# Patient Record
Sex: FEMALE | NOT HISPANIC OR LATINO
[De-identification: names, ages, dates, MRNs, and addresses within clinical notes are randomized per-mention and may not be internally consistent; named-entity substitution may affect disease eponyms.]

---

## 2017-10-09 ENCOUNTER — APPOINTMENT (OUTPATIENT)
Dept: SURGERY | Facility: CLINIC | Age: 32
End: 2017-10-09

## 2017-10-09 ENCOUNTER — OUTPATIENT (OUTPATIENT)
Dept: OUTPATIENT SERVICES | Facility: HOSPITAL | Age: 32
LOS: 1 days | End: 2017-10-09
Payer: COMMERCIAL

## 2017-10-09 VITALS
HEART RATE: 79 BPM | SYSTOLIC BLOOD PRESSURE: 135 MMHG | TEMPERATURE: 99 F | WEIGHT: 191.8 LBS | HEIGHT: 60 IN | DIASTOLIC BLOOD PRESSURE: 82 MMHG | RESPIRATION RATE: 16 BRPM

## 2017-10-09 DIAGNOSIS — Z01.818 ENCOUNTER FOR OTHER PREPROCEDURAL EXAMINATION: ICD-10-CM

## 2017-10-09 DIAGNOSIS — J30.89 OTHER ALLERGIC RHINITIS: ICD-10-CM

## 2017-10-09 DIAGNOSIS — M51.26 OTHER INTERVERTEBRAL DISC DISPLACEMENT, LUMBAR REGION: ICD-10-CM

## 2017-10-09 DIAGNOSIS — F41.1 GENERALIZED ANXIETY DISORDER: ICD-10-CM

## 2017-10-09 DIAGNOSIS — Z90.89 ACQUIRED ABSENCE OF OTHER ORGANS: Chronic | ICD-10-CM

## 2017-10-09 DIAGNOSIS — K08.499 PARTIAL LOSS OF TEETH DUE TO OTHER SPECIFIED CAUSE, UNSPECIFIED CLASS: Chronic | ICD-10-CM

## 2017-10-09 PROBLEM — Z00.00 ENCOUNTER FOR PREVENTIVE HEALTH EXAMINATION: Status: ACTIVE | Noted: 2017-10-09

## 2017-10-09 LAB
ALBUMIN SERPL ELPH-MCNC: 4.7 G/DL — SIGNIFICANT CHANGE UP (ref 3.3–5)
ALP SERPL-CCNC: 81 U/L — SIGNIFICANT CHANGE UP (ref 40–120)
ALT FLD-CCNC: 14 U/L — SIGNIFICANT CHANGE UP (ref 10–45)
ANION GAP SERPL CALC-SCNC: 16 MMOL/L — SIGNIFICANT CHANGE UP (ref 5–17)
APPEARANCE UR: SIGNIFICANT CHANGE UP
APTT BLD: 31.3 SEC — SIGNIFICANT CHANGE UP (ref 27.5–37.4)
AST SERPL-CCNC: 21 U/L — SIGNIFICANT CHANGE UP (ref 10–40)
BACTERIA # UR AUTO: PRESENT /HPF
BILIRUB SERPL-MCNC: 0.4 MG/DL — SIGNIFICANT CHANGE UP (ref 0.2–1.2)
BILIRUB UR-MCNC: NEGATIVE — SIGNIFICANT CHANGE UP
BUN SERPL-MCNC: 10 MG/DL — SIGNIFICANT CHANGE UP (ref 7–23)
CALCIUM SERPL-MCNC: 9.9 MG/DL — SIGNIFICANT CHANGE UP (ref 8.4–10.5)
CHLORIDE SERPL-SCNC: 99 MMOL/L — SIGNIFICANT CHANGE UP (ref 96–108)
CO2 SERPL-SCNC: 24 MMOL/L — SIGNIFICANT CHANGE UP (ref 22–31)
COLOR SPEC: YELLOW — SIGNIFICANT CHANGE UP
CREAT SERPL-MCNC: 0.82 MG/DL — SIGNIFICANT CHANGE UP (ref 0.5–1.3)
DIFF PNL FLD: NEGATIVE — SIGNIFICANT CHANGE UP
EPI CELLS # UR: (no result) /HPF
GLUCOSE SERPL-MCNC: 73 MG/DL — SIGNIFICANT CHANGE UP (ref 70–99)
GLUCOSE UR QL: NEGATIVE — SIGNIFICANT CHANGE UP
HCT VFR BLD CALC: 40 % — SIGNIFICANT CHANGE UP (ref 34.5–45)
HGB BLD-MCNC: 13.1 G/DL — SIGNIFICANT CHANGE UP (ref 11.5–15.5)
INR BLD: 1.07 — SIGNIFICANT CHANGE UP (ref 0.88–1.16)
KETONES UR-MCNC: NEGATIVE — SIGNIFICANT CHANGE UP
LEUKOCYTE ESTERASE UR-ACNC: (no result)
MCHC RBC-ENTMCNC: 28.5 PG — SIGNIFICANT CHANGE UP (ref 27–34)
MCHC RBC-ENTMCNC: 32.8 G/DL — SIGNIFICANT CHANGE UP (ref 32–36)
MCV RBC AUTO: 87 FL — SIGNIFICANT CHANGE UP (ref 80–100)
NITRITE UR-MCNC: NEGATIVE — SIGNIFICANT CHANGE UP
PH UR: 6 — SIGNIFICANT CHANGE UP (ref 5–8)
PLATELET # BLD AUTO: 229 K/UL — SIGNIFICANT CHANGE UP (ref 150–400)
POTASSIUM SERPL-MCNC: 3.7 MMOL/L — SIGNIFICANT CHANGE UP (ref 3.5–5.3)
POTASSIUM SERPL-SCNC: 3.7 MMOL/L — SIGNIFICANT CHANGE UP (ref 3.5–5.3)
PROT SERPL-MCNC: 8.6 G/DL — HIGH (ref 6–8.3)
PROT UR-MCNC: NEGATIVE MG/DL — SIGNIFICANT CHANGE UP
PROTHROM AB SERPL-ACNC: 11.9 SEC — SIGNIFICANT CHANGE UP (ref 9.8–12.7)
RBC # BLD: 4.6 M/UL — SIGNIFICANT CHANGE UP (ref 3.8–5.2)
RBC # FLD: 12.4 % — SIGNIFICANT CHANGE UP (ref 10.3–16.9)
RBC CASTS # UR COMP ASSIST: < 5 /HPF — SIGNIFICANT CHANGE UP
SODIUM SERPL-SCNC: 139 MMOL/L — SIGNIFICANT CHANGE UP (ref 135–145)
SP GR SPEC: >=1.03 — SIGNIFICANT CHANGE UP (ref 1–1.03)
UROBILINOGEN FLD QL: 0.2 E.U./DL — SIGNIFICANT CHANGE UP
WBC # BLD: 4.9 K/UL — SIGNIFICANT CHANGE UP (ref 3.8–10.5)
WBC # FLD AUTO: 4.9 K/UL — SIGNIFICANT CHANGE UP (ref 3.8–10.5)
WBC UR QL: (no result) /HPF

## 2017-10-09 PROCEDURE — 93010 ELECTROCARDIOGRAM REPORT: CPT

## 2017-10-09 NOTE — H&P PST ADULT - FAMILY HISTORY
Mother  Still living? Yes, Estimated age: Age Unknown  Family history of diabetes mellitus, Age at diagnosis: Age Unknown  Breast cancer, Age at diagnosis: Age Unknown
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
## 2017-10-09 NOTE — H&P PST ADULT - NSANTHOSAYNRD_GEN_A_CORE
No. KELSIE screening performed.  STOP BANG Legend: 0-2 = LOW Risk; 3-4 = INTERMEDIATE Risk; 5-8 = HIGH Risk

## 2017-10-10 LAB
CULTURE RESULTS: SIGNIFICANT CHANGE UP
SPECIMEN SOURCE: SIGNIFICANT CHANGE UP

## 2017-10-17 VITALS
TEMPERATURE: 98 F | HEART RATE: 90 BPM | SYSTOLIC BLOOD PRESSURE: 118 MMHG | RESPIRATION RATE: 18 BRPM | HEIGHT: 60 IN | WEIGHT: 190.04 LBS | OXYGEN SATURATION: 99 % | DIASTOLIC BLOOD PRESSURE: 56 MMHG

## 2017-10-17 NOTE — H&P ADULT - NSHPPHYSICALEXAM_GEN_ALL_CORE
Musculoskeletal: Decreased ROM secondary to pain, lumbar spine.    Remainder of physical exam as per medical clearance note. Musculoskeletal: Decreased ROM secondary to pain, lumbar spine. SLT INTACT, DP/PT 2+, EHL/TA/GS 5/5 b/l les    Remainder of physical exam as per medical clearance note.

## 2017-10-17 NOTE — H&P ADULT - PROBLEM SELECTOR PLAN 1
Admit to Orthopaedic Service.  Presents today for elective Anterior Spinal Fusion L5-S1, Anterior total disc replacement L4-L5, discectomy and fusion.  Pt medically stable and cleared for procedure today by Dr. Panda Glover MD.

## 2017-10-17 NOTE — H&P ADULT - NSHPLABSRESULTS_GEN_ALL_CORE
Preop CBC, BMP, PT/PTT/INR - WNL per medical clearance. Preop UA +WBCs 5-10/hpf, Bacteria present, moderate epithelial cells, culture insignificant amount of growth. Repeat UA on DOS.  Preop EKG sinus bradycardia - WNL per medical clearance
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
## 2017-10-18 ENCOUNTER — INPATIENT (INPATIENT)
Facility: HOSPITAL | Age: 32
LOS: 0 days | Discharge: HOME | DRG: 552 | End: 2017-10-18
Attending: ORTHOPAEDIC SURGERY | Admitting: ORTHOPAEDIC SURGERY
Payer: COMMERCIAL

## 2017-10-18 VITALS — SYSTOLIC BLOOD PRESSURE: 111 MMHG | DIASTOLIC BLOOD PRESSURE: 65 MMHG | HEART RATE: 65 BPM | RESPIRATION RATE: 18 BRPM

## 2017-10-18 DIAGNOSIS — K08.499 PARTIAL LOSS OF TEETH DUE TO OTHER SPECIFIED CAUSE, UNSPECIFIED CLASS: Chronic | ICD-10-CM

## 2017-10-18 DIAGNOSIS — Z90.89 ACQUIRED ABSENCE OF OTHER ORGANS: Chronic | ICD-10-CM

## 2017-10-18 DIAGNOSIS — M51.26 OTHER INTERVERTEBRAL DISC DISPLACEMENT, LUMBAR REGION: ICD-10-CM

## 2017-10-18 LAB
APPEARANCE UR: (no result)
BILIRUB UR-MCNC: (no result)
COLOR SPEC: YELLOW — SIGNIFICANT CHANGE UP
DIFF PNL FLD: (no result)
GLUCOSE UR QL: NEGATIVE — SIGNIFICANT CHANGE UP
KETONES UR-MCNC: 15 MG/DL
LEUKOCYTE ESTERASE UR-ACNC: (no result)
MRSA PCR RESULT.: NEGATIVE — SIGNIFICANT CHANGE UP
NITRITE UR-MCNC: NEGATIVE — SIGNIFICANT CHANGE UP
PH UR: 7.5 — SIGNIFICANT CHANGE UP (ref 5–8)
PROT UR-MCNC: NEGATIVE MG/DL — SIGNIFICANT CHANGE UP
S AUREUS DNA NOSE QL NAA+PROBE: POSITIVE
SP GR SPEC: 1.01 — SIGNIFICANT CHANGE UP (ref 1–1.03)
UROBILINOGEN FLD QL: 1 E.U./DL — SIGNIFICANT CHANGE UP

## 2017-10-18 RX ORDER — DICLOFENAC SODIUM 75 MG/1
0 TABLET, DELAYED RELEASE ORAL
Qty: 0 | Refills: 0 | COMMUNITY

## 2017-10-18 RX ORDER — INFLUENZA VIRUS VACCINE 15; 15; 15; 15 UG/.5ML; UG/.5ML; UG/.5ML; UG/.5ML
0.5 SUSPENSION INTRAMUSCULAR ONCE
Qty: 0 | Refills: 0 | Status: DISCONTINUED | OUTPATIENT
Start: 2017-10-18 | End: 2017-10-18

## 2017-10-19 LAB
CULTURE RESULTS: SIGNIFICANT CHANGE UP
SPECIMEN SOURCE: SIGNIFICANT CHANGE UP

## 2017-10-19 PROCEDURE — 87086 URINE CULTURE/COLONY COUNT: CPT

## 2017-10-19 PROCEDURE — 86901 BLOOD TYPING SEROLOGIC RH(D): CPT

## 2017-10-19 PROCEDURE — 86850 RBC ANTIBODY SCREEN: CPT

## 2017-10-19 PROCEDURE — 86900 BLOOD TYPING SEROLOGIC ABO: CPT

## 2017-10-19 PROCEDURE — 81001 URINALYSIS AUTO W/SCOPE: CPT

## 2017-10-19 PROCEDURE — 87641 MR-STAPH DNA AMP PROBE: CPT

## 2017-10-20 DIAGNOSIS — M48.07 SPINAL STENOSIS, LUMBOSACRAL REGION: ICD-10-CM

## 2017-10-20 DIAGNOSIS — M48.061 SPINAL STENOSIS, LUMBAR REGION WITHOUT NEUROGENIC CLAUDICATION: ICD-10-CM

## 2017-10-20 DIAGNOSIS — M54.32 SCIATICA, LEFT SIDE: ICD-10-CM

## 2017-10-20 DIAGNOSIS — E66.9 OBESITY, UNSPECIFIED: ICD-10-CM

## 2017-10-20 DIAGNOSIS — Z53.09 PROCEDURE AND TREATMENT NOT CARRIED OUT BECAUSE OF OTHER CONTRAINDICATION: ICD-10-CM

## 2017-10-24 VITALS
DIASTOLIC BLOOD PRESSURE: 53 MMHG | WEIGHT: 190.04 LBS | RESPIRATION RATE: 18 BRPM | HEIGHT: 60 IN | OXYGEN SATURATION: 98 % | TEMPERATURE: 98 F | SYSTOLIC BLOOD PRESSURE: 104 MMHG | HEART RATE: 91 BPM

## 2017-10-24 NOTE — H&P ADULT - NSHPPHYSICALEXAM_GEN_ALL_CORE
Back decreased ROM secondary to pain  Rest of PE per MD clearance PE: A+O x 3  Normal head, atraumatic. Normal skin, no rashes/lesions/erythema.    MSK: Back decreased ROM secondary to pain. No deformity or open wounds. No rashes or lesions. Left GS: 4/5, right GS: 5/5. EHL/TA/FHL 5/5 BLE. Sensation intact and equal BLE. Skin warm and well perfused. DP palpable BLE. Cap refill brisk.     Rest of PE per MD clearance PE: A+O x 3  Normal head, atraumatic. Normal skin, no rashes/lesions/erythema.    MSK: Back decreased ROM secondary to pain. Left GS: 4/5. Decreased sensation to left foot diffusely. No deformity or open wounds. No rashes or lesions. Right GS: 5/5. EHL/TA/FHL 5/5 BLE. Sensation intact to right LE. Skin warm and well perfused. DP palpable BLE. Cap refill brisk.     Rest of PE per MD clearance

## 2017-10-24 NOTE — PATIENT PROFILE ADULT. - VISION (WITH CORRECTIVE LENSES IF THE PATIENT USUALLY WEARS THEM):
Normal vision: sees adequately in most situations; can see medication labels, newsprint/glasses for reading and distance

## 2017-10-24 NOTE — H&P ADULT - NSHPLABSRESULTS_GEN_ALL_CORE
Preop cbc, bmp, pt/inr, ptt wnl; nasal swab +MSSA  +UA but urine culture neg  preop ekg sinus lorena per clearance
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
## 2017-10-25 ENCOUNTER — RESULT REVIEW (OUTPATIENT)
Age: 32
End: 2017-10-25

## 2017-10-25 ENCOUNTER — INPATIENT (INPATIENT)
Facility: HOSPITAL | Age: 32
LOS: 4 days | Discharge: HOME | DRG: 460 | End: 2017-10-30
Attending: ORTHOPAEDIC SURGERY | Admitting: ORTHOPAEDIC SURGERY
Payer: COMMERCIAL

## 2017-10-25 DIAGNOSIS — Z90.89 ACQUIRED ABSENCE OF OTHER ORGANS: Chronic | ICD-10-CM

## 2017-10-25 DIAGNOSIS — K08.499 PARTIAL LOSS OF TEETH DUE TO OTHER SPECIFIED CAUSE, UNSPECIFIED CLASS: Chronic | ICD-10-CM

## 2017-10-25 DIAGNOSIS — M51.26 OTHER INTERVERTEBRAL DISC DISPLACEMENT, LUMBAR REGION: ICD-10-CM

## 2017-10-25 LAB
ANION GAP SERPL CALC-SCNC: 15 MMOL/L — SIGNIFICANT CHANGE UP (ref 5–17)
BASOPHILS NFR BLD AUTO: 0.1 % — SIGNIFICANT CHANGE UP (ref 0–2)
BUN SERPL-MCNC: 12 MG/DL — SIGNIFICANT CHANGE UP (ref 7–23)
CALCIUM SERPL-MCNC: 8.8 MG/DL — SIGNIFICANT CHANGE UP (ref 8.4–10.5)
CHLORIDE SERPL-SCNC: 101 MMOL/L — SIGNIFICANT CHANGE UP (ref 96–108)
CO2 SERPL-SCNC: 22 MMOL/L — SIGNIFICANT CHANGE UP (ref 22–31)
CREAT SERPL-MCNC: 0.74 MG/DL — SIGNIFICANT CHANGE UP (ref 0.5–1.3)
EOSINOPHIL NFR BLD AUTO: 0.1 % — SIGNIFICANT CHANGE UP (ref 0–6)
GLUCOSE SERPL-MCNC: 195 MG/DL — HIGH (ref 70–99)
HCT VFR BLD CALC: 33.3 % — LOW (ref 34.5–45)
HGB BLD-MCNC: 11.2 G/DL — LOW (ref 11.5–15.5)
LYMPHOCYTES # BLD AUTO: 10.2 % — LOW (ref 13–44)
MCHC RBC-ENTMCNC: 29.2 PG — SIGNIFICANT CHANGE UP (ref 27–34)
MCHC RBC-ENTMCNC: 33.6 G/DL — SIGNIFICANT CHANGE UP (ref 32–36)
MCV RBC AUTO: 86.7 FL — SIGNIFICANT CHANGE UP (ref 80–100)
MONOCYTES NFR BLD AUTO: 2.7 % — SIGNIFICANT CHANGE UP (ref 2–14)
NEUTROPHILS NFR BLD AUTO: 86.9 % — HIGH (ref 43–77)
PLATELET # BLD AUTO: 228 K/UL — SIGNIFICANT CHANGE UP (ref 150–400)
POTASSIUM SERPL-MCNC: 3.8 MMOL/L — SIGNIFICANT CHANGE UP (ref 3.5–5.3)
POTASSIUM SERPL-SCNC: 3.8 MMOL/L — SIGNIFICANT CHANGE UP (ref 3.5–5.3)
RBC # BLD: 3.84 M/UL — SIGNIFICANT CHANGE UP (ref 3.8–5.2)
RBC # FLD: 12.9 % — SIGNIFICANT CHANGE UP (ref 10.3–16.9)
SODIUM SERPL-SCNC: 138 MMOL/L — SIGNIFICANT CHANGE UP (ref 135–145)
WBC # BLD: 16.2 K/UL — HIGH (ref 3.8–10.5)
WBC # FLD AUTO: 16.2 K/UL — HIGH (ref 3.8–10.5)

## 2017-10-25 PROCEDURE — 22585 ARTHRD ANT NTRBD MIN DSC EA: CPT | Mod: 62,GC

## 2017-10-25 PROCEDURE — 22556 ARTHRD ANT NTRBD MIN DSC THC: CPT | Mod: 62,GC

## 2017-10-25 RX ORDER — FAMOTIDINE 10 MG/ML
20 INJECTION INTRAVENOUS EVERY 12 HOURS
Qty: 0 | Refills: 0 | Status: DISCONTINUED | OUTPATIENT
Start: 2017-10-25 | End: 2017-10-30

## 2017-10-25 RX ORDER — HYDROMORPHONE HYDROCHLORIDE 2 MG/ML
0.5 INJECTION INTRAMUSCULAR; INTRAVENOUS; SUBCUTANEOUS
Qty: 0 | Refills: 0 | Status: DISCONTINUED | OUTPATIENT
Start: 2017-10-25 | End: 2017-10-25

## 2017-10-25 RX ORDER — OXYCODONE AND ACETAMINOPHEN 5; 325 MG/1; MG/1
2 TABLET ORAL EVERY 4 HOURS
Qty: 0 | Refills: 0 | Status: DISCONTINUED | OUTPATIENT
Start: 2017-10-25 | End: 2017-10-26

## 2017-10-25 RX ORDER — DOCUSATE SODIUM 100 MG
100 CAPSULE ORAL
Qty: 0 | Refills: 0 | Status: DISCONTINUED | OUTPATIENT
Start: 2017-10-25 | End: 2017-10-30

## 2017-10-25 RX ORDER — SODIUM CHLORIDE 9 MG/ML
1000 INJECTION, SOLUTION INTRAVENOUS
Qty: 0 | Refills: 0 | Status: DISCONTINUED | OUTPATIENT
Start: 2017-10-25 | End: 2017-10-30

## 2017-10-25 RX ORDER — SENNA PLUS 8.6 MG/1
2 TABLET ORAL AT BEDTIME
Qty: 0 | Refills: 0 | Status: DISCONTINUED | OUTPATIENT
Start: 2017-10-25 | End: 2017-10-30

## 2017-10-25 RX ORDER — ACETAMINOPHEN 500 MG
650 TABLET ORAL EVERY 6 HOURS
Qty: 0 | Refills: 0 | Status: DISCONTINUED | OUTPATIENT
Start: 2017-10-25 | End: 2017-10-26

## 2017-10-25 RX ORDER — ONDANSETRON 8 MG/1
4 TABLET, FILM COATED ORAL EVERY 6 HOURS
Qty: 0 | Refills: 0 | Status: DISCONTINUED | OUTPATIENT
Start: 2017-10-25 | End: 2017-10-30

## 2017-10-25 RX ORDER — FAMOTIDINE 10 MG/ML
20 INJECTION INTRAVENOUS ONCE
Qty: 0 | Refills: 0 | Status: COMPLETED | OUTPATIENT
Start: 2017-10-25 | End: 2017-10-25

## 2017-10-25 RX ORDER — HYDROMORPHONE HYDROCHLORIDE 2 MG/ML
0.5 INJECTION INTRAMUSCULAR; INTRAVENOUS; SUBCUTANEOUS EVERY 4 HOURS
Qty: 0 | Refills: 0 | Status: DISCONTINUED | OUTPATIENT
Start: 2017-10-25 | End: 2017-10-26

## 2017-10-25 RX ORDER — SCOPALAMINE 1 MG/3D
1.5 PATCH, EXTENDED RELEASE TRANSDERMAL
Qty: 0 | Refills: 0 | Status: DISCONTINUED | OUTPATIENT
Start: 2017-10-25 | End: 2017-10-30

## 2017-10-25 RX ORDER — INFLUENZA VIRUS VACCINE 15; 15; 15; 15 UG/.5ML; UG/.5ML; UG/.5ML; UG/.5ML
0.5 SUSPENSION INTRAMUSCULAR ONCE
Qty: 0 | Refills: 0 | Status: DISCONTINUED | OUTPATIENT
Start: 2017-10-25 | End: 2017-10-30

## 2017-10-25 RX ORDER — BUPIVACAINE 13.3 MG/ML
20 INJECTION, SUSPENSION, LIPOSOMAL INFILTRATION ONCE
Qty: 0 | Refills: 0 | Status: DISCONTINUED | OUTPATIENT
Start: 2017-10-25 | End: 2017-10-30

## 2017-10-25 RX ORDER — CEFAZOLIN SODIUM 1 G
2000 VIAL (EA) INJECTION EVERY 8 HOURS
Qty: 0 | Refills: 0 | Status: COMPLETED | OUTPATIENT
Start: 2017-10-25 | End: 2017-10-26

## 2017-10-25 RX ORDER — ACETAMINOPHEN 500 MG
650 TABLET ORAL EVERY 6 HOURS
Qty: 0 | Refills: 0 | Status: DISCONTINUED | OUTPATIENT
Start: 2017-10-25 | End: 2017-10-30

## 2017-10-25 RX ORDER — METOCLOPRAMIDE HCL 10 MG
10 TABLET ORAL EVERY 6 HOURS
Qty: 0 | Refills: 0 | Status: DISCONTINUED | OUTPATIENT
Start: 2017-10-25 | End: 2017-10-30

## 2017-10-25 RX ORDER — OXYCODONE AND ACETAMINOPHEN 5; 325 MG/1; MG/1
1 TABLET ORAL EVERY 4 HOURS
Qty: 0 | Refills: 0 | Status: DISCONTINUED | OUTPATIENT
Start: 2017-10-25 | End: 2017-10-26

## 2017-10-25 RX ADMIN — HYDROMORPHONE HYDROCHLORIDE 0.5 MILLIGRAM(S): 2 INJECTION INTRAMUSCULAR; INTRAVENOUS; SUBCUTANEOUS at 15:15

## 2017-10-25 RX ADMIN — FAMOTIDINE 20 MILLIGRAM(S): 10 INJECTION INTRAVENOUS at 16:39

## 2017-10-25 RX ADMIN — HYDROMORPHONE HYDROCHLORIDE 0.5 MILLIGRAM(S): 2 INJECTION INTRAMUSCULAR; INTRAVENOUS; SUBCUTANEOUS at 21:00

## 2017-10-25 RX ADMIN — Medication 100 MILLIGRAM(S): at 20:45

## 2017-10-25 RX ADMIN — ONDANSETRON 4 MILLIGRAM(S): 8 TABLET, FILM COATED ORAL at 20:59

## 2017-10-25 RX ADMIN — SCOPALAMINE 1.5 MILLIGRAM(S): 1 PATCH, EXTENDED RELEASE TRANSDERMAL at 23:45

## 2017-10-25 RX ADMIN — HYDROMORPHONE HYDROCHLORIDE 0.5 MILLIGRAM(S): 2 INJECTION INTRAMUSCULAR; INTRAVENOUS; SUBCUTANEOUS at 16:00

## 2017-10-25 RX ADMIN — HYDROMORPHONE HYDROCHLORIDE 0.5 MILLIGRAM(S): 2 INJECTION INTRAMUSCULAR; INTRAVENOUS; SUBCUTANEOUS at 18:15

## 2017-10-25 RX ADMIN — HYDROMORPHONE HYDROCHLORIDE 0.5 MILLIGRAM(S): 2 INJECTION INTRAMUSCULAR; INTRAVENOUS; SUBCUTANEOUS at 20:45

## 2017-10-25 RX ADMIN — SODIUM CHLORIDE 125 MILLILITER(S): 9 INJECTION, SOLUTION INTRAVENOUS at 15:00

## 2017-10-25 RX ADMIN — HYDROMORPHONE HYDROCHLORIDE 0.5 MILLIGRAM(S): 2 INJECTION INTRAMUSCULAR; INTRAVENOUS; SUBCUTANEOUS at 17:48

## 2017-10-25 NOTE — PROGRESS NOTE ADULT - SUBJECTIVE AND OBJECTIVE BOX
Procedure: anterior spine exposure  Surgeon: Mitchell    S: Pt has no complaints. Denies CP, SOB, VAZQUEZ, calf tenderness. Slight left toe numbness. Pain controlled with medication.    O:  T(C): 36.2 (10-25-17 @ 16:30), Max: 36.2 (10-25-17 @ 16:30)  T(F): 97.2 (10-25-17 @ 16:30), Max: 97.2 (10-25-17 @ 16:30)  HR: 90 (10-25-17 @ 16:30) (88 - 124)  BP: 111/68 (10-25-17 @ 16:30) (93/64 - 141/52)  RR: 21 (10-25-17 @ 16:30) (14 - 113)  SpO2: 98% (10-25-17 @ 16:30) (98% - 100%)  Wt(kg): --                        11.2   16.2  )-----------( 228      ( 25 Oct 2017 15:00 )             33.3     10-25    138  |  101  |  12  ----------------------------<  195<H>  3.8   |  22  |  0.74    Ca    8.8      25 Oct 2017 15:00        Gen: NAD, resting comfortably in bed  C/V: NSR  Pulm: Nonlabored breathing, no respiratory distress  Abd: soft, NT/ND Incision: CDI  Extrem: WWP, no calf edema, SCDs in place  Vasc: R PT palpable, R DP palpable, L DP weakly palpable, triphasic signals unchanged from pre-op, L PT triphasic     A/P: 31yFemale s/p above procedure  Diet: NPO  IVF  Pain/nausea control  DVT ppx  care per ortho

## 2017-10-25 NOTE — PROGRESS NOTE ADULT - SUBJECTIVE AND OBJECTIVE BOX
Orthopaedic Post Op Check Note    Procedure: Anterior spinal fusion L5-S1, ADR L4-L5  Surgeon: Dr. Leiva    31y Female comfortable, stable and alert in PACU. Pain control adequate at this time. Pt endorses some mild abdominal discomfort but otherwise has no complaints  Denies N/V, CP, SOB, numbness/tingling of extremities.    PE: AVSS, NAD  Vital Signs Last 24 Hrs  T(C): 36.2 (25 Oct 2017 16:30), Max: 36.2 (25 Oct 2017 16:30)  T(F): 97.2 (25 Oct 2017 16:30), Max: 97.2 (25 Oct 2017 16:30)  HR: 90 (25 Oct 2017 16:30) (88 - 124)  BP: 111/68 (25 Oct 2017 16:30) (93/64 - 141/52)  BP(mean): 70 (25 Oct 2017 16:00) (70 - 93)  RR: 21 (25 Oct 2017 16:30) (14 - 113)  SpO2: 98% (25 Oct 2017 16:30) (98% - 100%)    General: Pt alert and oriented, reclined in hospital bed in NAD.  Dressing: Anterior abdominal dressing stained with serosanguinous drainage but intact without leaking.  Motor: Motor Strength 4/5 to Left EHL/FHL, 5/5 to Right EHL/FHL, 5/5 to bilateral TA/GS.  Neuro: Sensation intact to bilateral LE distally.   Pulses: DP/PT 2+, Brisk cap refill, Toes wwp     Post Op labs:                        11.2   16.2  )-----------( 228      ( 25 Oct 2017 15:00 )             33.3     10-25    138  |  101  |  12  ----------------------------<  195<H>  3.8   |  22  |  0.74    Ca    8.8      25 Oct 2017 15:00        Post op X-Ray: intraoperative fluoroscopy utilized in OR.    A/P: 31y Female POD#0 s/p Anterior spinal fusion L5-S1, ADR L4-L5  - Stable  - Pain Control   - DVT ppx: SCDs  - Bella op IV abx: Ancef  - PT, WBS: WBAT  - IVF: LR  - F/U AM Labs  - Appreciated vascular surgery input for follow up    Carrie Avalos PA-C  Ortho Consult Pager: [phone]

## 2017-10-25 NOTE — BRIEF OPERATIVE NOTE - PROCEDURE
<<-----Click on this checkbox to enter Procedure Spine exposure, circumferential, with vertebral resection, 1-2 segments  10/25/2017    Active  LTELIS

## 2017-10-26 ENCOUNTER — TRANSCRIPTION ENCOUNTER (OUTPATIENT)
Age: 32
End: 2017-10-26

## 2017-10-26 LAB
ANION GAP SERPL CALC-SCNC: 10 MMOL/L — SIGNIFICANT CHANGE UP (ref 5–17)
BASOPHILS NFR BLD AUTO: 0.1 % — SIGNIFICANT CHANGE UP (ref 0–2)
BUN SERPL-MCNC: 7 MG/DL — SIGNIFICANT CHANGE UP (ref 7–23)
CALCIUM SERPL-MCNC: 9 MG/DL — SIGNIFICANT CHANGE UP (ref 8.4–10.5)
CHLORIDE SERPL-SCNC: 106 MMOL/L — SIGNIFICANT CHANGE UP (ref 96–108)
CO2 SERPL-SCNC: 26 MMOL/L — SIGNIFICANT CHANGE UP (ref 22–31)
CREAT SERPL-MCNC: 0.69 MG/DL — SIGNIFICANT CHANGE UP (ref 0.5–1.3)
EOSINOPHIL NFR BLD AUTO: 0.1 % — SIGNIFICANT CHANGE UP (ref 0–6)
GLUCOSE SERPL-MCNC: 110 MG/DL — HIGH (ref 70–99)
HCT VFR BLD CALC: 30.4 % — LOW (ref 34.5–45)
HGB BLD-MCNC: 9.9 G/DL — LOW (ref 11.5–15.5)
LYMPHOCYTES # BLD AUTO: 19.1 % — SIGNIFICANT CHANGE UP (ref 13–44)
MCHC RBC-ENTMCNC: 28.9 PG — SIGNIFICANT CHANGE UP (ref 27–34)
MCHC RBC-ENTMCNC: 32.6 G/DL — SIGNIFICANT CHANGE UP (ref 32–36)
MCV RBC AUTO: 88.6 FL — SIGNIFICANT CHANGE UP (ref 80–100)
MONOCYTES NFR BLD AUTO: 10 % — SIGNIFICANT CHANGE UP (ref 2–14)
NEUTROPHILS NFR BLD AUTO: 70.7 % — SIGNIFICANT CHANGE UP (ref 43–77)
PLATELET # BLD AUTO: 170 K/UL — SIGNIFICANT CHANGE UP (ref 150–400)
POTASSIUM SERPL-MCNC: 4 MMOL/L — SIGNIFICANT CHANGE UP (ref 3.5–5.3)
POTASSIUM SERPL-SCNC: 4 MMOL/L — SIGNIFICANT CHANGE UP (ref 3.5–5.3)
RBC # BLD: 3.43 M/UL — LOW (ref 3.8–5.2)
RBC # FLD: 12.5 % — SIGNIFICANT CHANGE UP (ref 10.3–16.9)
SODIUM SERPL-SCNC: 142 MMOL/L — SIGNIFICANT CHANGE UP (ref 135–145)
WBC # BLD: 8.6 K/UL — SIGNIFICANT CHANGE UP (ref 3.8–10.5)
WBC # FLD AUTO: 8.6 K/UL — SIGNIFICANT CHANGE UP (ref 3.8–10.5)

## 2017-10-26 RX ORDER — ACETAMINOPHEN 500 MG
975 TABLET ORAL EVERY 8 HOURS
Qty: 0 | Refills: 0 | Status: DISCONTINUED | OUTPATIENT
Start: 2017-10-26 | End: 2017-10-30

## 2017-10-26 RX ORDER — HYDROMORPHONE HYDROCHLORIDE 2 MG/ML
0.5 INJECTION INTRAMUSCULAR; INTRAVENOUS; SUBCUTANEOUS
Qty: 0 | Refills: 0 | Status: DISCONTINUED | OUTPATIENT
Start: 2017-10-26 | End: 2017-10-26

## 2017-10-26 RX ORDER — NALOXONE HYDROCHLORIDE 4 MG/.1ML
0.1 SPRAY NASAL
Qty: 0 | Refills: 0 | Status: DISCONTINUED | OUTPATIENT
Start: 2017-10-26 | End: 2017-10-30

## 2017-10-26 RX ORDER — HYDROMORPHONE HYDROCHLORIDE 2 MG/ML
30 INJECTION INTRAMUSCULAR; INTRAVENOUS; SUBCUTANEOUS
Qty: 0 | Refills: 0 | Status: DISCONTINUED | OUTPATIENT
Start: 2017-10-26 | End: 2017-10-27

## 2017-10-26 RX ORDER — HYDROMORPHONE HYDROCHLORIDE 2 MG/ML
0.5 INJECTION INTRAMUSCULAR; INTRAVENOUS; SUBCUTANEOUS
Qty: 0 | Refills: 0 | Status: DISCONTINUED | OUTPATIENT
Start: 2017-10-26 | End: 2017-10-27

## 2017-10-26 RX ORDER — HYDROMORPHONE HYDROCHLORIDE 2 MG/ML
2 INJECTION INTRAMUSCULAR; INTRAVENOUS; SUBCUTANEOUS ONCE
Qty: 0 | Refills: 0 | Status: DISCONTINUED | OUTPATIENT
Start: 2017-10-26 | End: 2017-10-26

## 2017-10-26 RX ADMIN — OXYCODONE AND ACETAMINOPHEN 2 TABLET(S): 5; 325 TABLET ORAL at 05:08

## 2017-10-26 RX ADMIN — HYDROMORPHONE HYDROCHLORIDE 0.5 MILLIGRAM(S): 2 INJECTION INTRAMUSCULAR; INTRAVENOUS; SUBCUTANEOUS at 13:00

## 2017-10-26 RX ADMIN — Medication 100 MILLIGRAM(S): at 05:32

## 2017-10-26 RX ADMIN — HYDROMORPHONE HYDROCHLORIDE 0.5 MILLIGRAM(S): 2 INJECTION INTRAMUSCULAR; INTRAVENOUS; SUBCUTANEOUS at 00:30

## 2017-10-26 RX ADMIN — Medication 100 MILLIGRAM(S): at 17:07

## 2017-10-26 RX ADMIN — HYDROMORPHONE HYDROCHLORIDE 2 MILLIGRAM(S): 2 INJECTION INTRAMUSCULAR; INTRAVENOUS; SUBCUTANEOUS at 11:52

## 2017-10-26 RX ADMIN — FAMOTIDINE 20 MILLIGRAM(S): 10 INJECTION INTRAVENOUS at 05:32

## 2017-10-26 RX ADMIN — SENNA PLUS 2 TABLET(S): 8.6 TABLET ORAL at 21:34

## 2017-10-26 RX ADMIN — HYDROMORPHONE HYDROCHLORIDE 0.5 MILLIGRAM(S): 2 INJECTION INTRAMUSCULAR; INTRAVENOUS; SUBCUTANEOUS at 00:13

## 2017-10-26 RX ADMIN — Medication 100 MILLIGRAM(S): at 03:26

## 2017-10-26 RX ADMIN — HYDROMORPHONE HYDROCHLORIDE 0.5 MILLIGRAM(S): 2 INJECTION INTRAMUSCULAR; INTRAVENOUS; SUBCUTANEOUS at 08:59

## 2017-10-26 RX ADMIN — HYDROMORPHONE HYDROCHLORIDE 2 MILLIGRAM(S): 2 INJECTION INTRAMUSCULAR; INTRAVENOUS; SUBCUTANEOUS at 11:07

## 2017-10-26 RX ADMIN — OXYCODONE AND ACETAMINOPHEN 2 TABLET(S): 5; 325 TABLET ORAL at 06:00

## 2017-10-26 RX ADMIN — HYDROMORPHONE HYDROCHLORIDE 0.5 MILLIGRAM(S): 2 INJECTION INTRAMUSCULAR; INTRAVENOUS; SUBCUTANEOUS at 04:11

## 2017-10-26 RX ADMIN — FAMOTIDINE 20 MILLIGRAM(S): 10 INJECTION INTRAVENOUS at 17:07

## 2017-10-26 RX ADMIN — Medication 975 MILLIGRAM(S): at 21:35

## 2017-10-26 RX ADMIN — HYDROMORPHONE HYDROCHLORIDE 0.5 MILLIGRAM(S): 2 INJECTION INTRAMUSCULAR; INTRAVENOUS; SUBCUTANEOUS at 04:30

## 2017-10-26 RX ADMIN — HYDROMORPHONE HYDROCHLORIDE 0.5 MILLIGRAM(S): 2 INJECTION INTRAMUSCULAR; INTRAVENOUS; SUBCUTANEOUS at 14:00

## 2017-10-26 RX ADMIN — HYDROMORPHONE HYDROCHLORIDE 0.5 MILLIGRAM(S): 2 INJECTION INTRAMUSCULAR; INTRAVENOUS; SUBCUTANEOUS at 21:10

## 2017-10-26 RX ADMIN — HYDROMORPHONE HYDROCHLORIDE 0.5 MILLIGRAM(S): 2 INJECTION INTRAMUSCULAR; INTRAVENOUS; SUBCUTANEOUS at 12:32

## 2017-10-26 RX ADMIN — HYDROMORPHONE HYDROCHLORIDE 30 MILLILITER(S): 2 INJECTION INTRAMUSCULAR; INTRAVENOUS; SUBCUTANEOUS at 13:42

## 2017-10-26 RX ADMIN — HYDROMORPHONE HYDROCHLORIDE 0.5 MILLIGRAM(S): 2 INJECTION INTRAMUSCULAR; INTRAVENOUS; SUBCUTANEOUS at 09:31

## 2017-10-26 NOTE — DISCHARGE NOTE ADULT - MEDICATION SUMMARY - MEDICATIONS TO TAKE
I will START or STAY ON the medications listed below when I get home from the hospital:    traMADol 50 mg oral tablet  -- 1 tab(s) by mouth every 6 hours, As Needed -for severe pain MDD:4 tabs   -- Caution federal law prohibits the transfer of this drug to any person other  than the person for whom it was prescribed.  May cause drowsiness.  Alcohol may intensify this effect.  Use care when operating dangerous machinery.  Obtain medical advice before taking any non-prescription drugs as some may affect the action of this medication.    -- Indication: For HNP (herniated nucleus pulposus), lumbar    docusate sodium 100 mg oral capsule  -- 1 cap(s) by mouth 2 times a day  -- Indication: For laxative    senna oral tablet  -- 2 tab(s) by mouth once a day (at bedtime)  -- Indication: For laxative

## 2017-10-26 NOTE — PHYSICAL THERAPY INITIAL EVALUATION ADULT - ADDITIONAL COMMENTS
Pt lives in house with 4 stairs to enter, bilateral handrails. Pt was previously not ambulating much, about 2 blocks without AD. Mother and sister can help her.

## 2017-10-26 NOTE — DISCHARGE NOTE ADULT - VISION (WITH CORRECTIVE LENSES IF THE PATIENT USUALLY WEARS THEM):
glasses for reading and distance/Normal vision: sees adequately in most situations; can see medication labels, newsprint
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
## 2017-10-26 NOTE — DISCHARGE NOTE ADULT - NS AS ACTIVITY OBS
Do not make important decisions/Do not drive or operate machinery/Walking-Indoors allowed/Stairs allowed/No Heavy lifting/straining/Walking-Outdoors allowed

## 2017-10-26 NOTE — PROGRESS NOTE ADULT - SUBJECTIVE AND OBJECTIVE BOX
SUBJECTIVE: Pt seen and examined at bedside. No overnight events.  C/o incisional and back pain, had brief episode of L foot numbness earlier that lasted several minutes, resolved    Vital Signs Last 24 Hrs  T(C): 37.1 (26 Oct 2017 08:34), Max: 37.1 (26 Oct 2017 08:34)  T(F): 98.7 (26 Oct 2017 08:34), Max: 98.7 (26 Oct 2017 08:34)  HR: 85 (26 Oct 2017 08:34) (73 - 124)  BP: 95/53 (26 Oct 2017 08:34) (93/64 - 141/52)  BP(mean): 70 (25 Oct 2017 16:00) (70 - 93)  RR: 15 (26 Oct 2017 08:34) (14 - 113)  SpO2: 99% (26 Oct 2017 08:34) (96% - 100%)    PHYSICAL EXAM    Gen: NAD  CV: RRR  Pulm: no resp distress  Abd: Soft, appropriately tender, dressings CDI  Ext: WWP, motor, sensory strength intact b/l  Vasc: palpable b/l dp/pt pulses    I&O's Detail    25 Oct 2017 07:01  -  26 Oct 2017 07:00  --------------------------------------------------------  IN:    lactated ringers.: 1750 mL  Total IN: 1750 mL    OUT:    Indwelling Catheter - Urethral: 2995 mL  Total OUT: 2995 mL    Total NET: -1245 mL          LABS:                        9.9    8.6   )-----------( 170      ( 26 Oct 2017 08:07 )             30.4     10-26    142  |  106  |  7   ----------------------------<  110<H>  4.0   |  26  |  0.69    Ca    9.0      26 Oct 2017 08:07            MEDICATIONS  (STANDING):  BUpivacaine liposome 1.3% Injectable (no eMAR) 20 milliLiter(s) Local Injection once  docusate sodium 100 milliGRAM(s) Oral two times a day  famotidine    Tablet 20 milliGRAM(s) Oral every 12 hours  influenza   Vaccine 0.5 milliLiter(s) IntraMuscular once  lactated ringers. 1000 milliLiter(s) (125 mL/Hr) IV Continuous <Continuous>  scopolamine   Patch 1.5 milliGRAM(s) Transdermal every 3 days  senna 2 Tablet(s) Oral at bedtime    MEDICATIONS  (PRN):  acetaminophen   Tablet 650 milliGRAM(s) Oral every 6 hours PRN For Temp greater than 38 C (100.4 F)  acetaminophen   Tablet. 650 milliGRAM(s) Oral every 6 hours PRN Mild Pain (1 - 3)  aluminum hydroxide/magnesium hydroxide/simethicone Suspension 30 milliLiter(s) Oral every 12 hours PRN Indigestion  HYDROmorphone  Injectable 0.5 milliGRAM(s) IV Push every 4 hours PRN breakthrough pain  metoclopramide Injectable 10 milliGRAM(s) IV Push every 6 hours PRN nausea/vomiting  ondansetron Injectable 4 milliGRAM(s) IV Push every 6 hours PRN Nausea  oxyCODONE    5 mG/acetaminophen 325 mG 1 Tablet(s) Oral every 4 hours PRN Moderate Pain  oxyCODONE    5 mG/acetaminophen 325 mG 2 Tablet(s) Oral every 4 hours PRN Severe Pain      RADIOLOGY & ADDITIONAL STUDIES:    ASSESSMENT AND PLAN  32 yo F POD#1 s/p Anterior spinal fusion L5-S1, ADR L4-L5  -adv diet when having BF  -palpable distal pulses b/l  -care per ortho team

## 2017-10-26 NOTE — PHYSICAL THERAPY INITIAL EVALUATION ADULT - DID THE PATIENT HAVE SURGERY?
yes/Spine exposure, circumferential, with vertebral resection, 1-2 segments  10/25/2017    Active  LTELIS

## 2017-10-26 NOTE — DISCHARGE NOTE ADULT - CARE PROVIDER_API CALL
Mango Leiva), Orthopaedic Surgery  [address] [place] [address]  Phone: [phone]  Fax: [phone] Mango Leiva), Orthopaedic Surgery  [address] [place] [address]  Phone: [phone]  Fax: [phone]

## 2017-10-26 NOTE — PHYSICAL THERAPY INITIAL EVALUATION ADULT - RANGE OF MOTION EXAMINATION, REHAB EVAL
WFL while obeying spinal precautions.  Unable to fully assess MMT due to short amount of time left in sitting.

## 2017-10-26 NOTE — PROGRESS NOTE ADULT - SUBJECTIVE AND OBJECTIVE BOX
ORTHO NOTE    [x ] Pt seen/examined.  [ ] Pt without any complaints/in NAD.    [x ] Pt complains of: 10/10 pain, from abdominal incision radiating to low back, not well controlled despite meds given. No numbness, tingling or headache. No N/V/D. Denies flatus.      ROS: [ ] Fever  [ ] Chills  [ ] CP [ ] SOB [ ] Dysnea  [ ] Palpitations [ ] Cough [ ] N/V/C/D [ ] Paresthia [ ] Other     [ ] ROS  otherwise negative    .    PHYSICAL EXAM:    Vital Signs Last 24 Hrs  T(C): 37.1 (26 Oct 2017 08:34), Max: 37.1 (26 Oct 2017 08:34)  T(F): 98.7 (26 Oct 2017 08:34), Max: 98.7 (26 Oct 2017 08:34)  HR: 85 (26 Oct 2017 08:34) (73 - 124)  BP: 95/53 (26 Oct 2017 08:34) (93/64 - 141/52)  BP(mean): 70 (25 Oct 2017 16:00) (70 - 93)  RR: 15 (26 Oct 2017 08:34) (14 - 113)  SpO2: 99% (26 Oct 2017 08:34) (96% - 100%)    I&O's Detail    25 Oct 2017 07:01  -  26 Oct 2017 07:00  --------------------------------------------------------  IN:    lactated ringers.: 1750 mL  Total IN: 1750 mL    OUT:    Indwelling Catheter - Urethral: 2995 mL  Total OUT: 2995 mL    Total NET: -1245 mL           CAPILLARY BLOOD GLUCOSE                      Neuro: NAD AO x3    Lungs:    CV:    ABD: Soft NT ND. No bowel signs audible this morning. Dressing mildly saturated, serosanguinous fluid.    Ext: 5/5 FHL EHL GS TA  Sensation decreased in left dorsum of foot.  WWP B/L LE    LABS   26 Oct 2017 08:07    142    |  106    |  7      ----------------------------<  110    4.0     |  26     |  0.69     Ca    9.0        26 Oct 2017 08:07                                   9.9    8.6   )-----------( 170      ( 26 Oct 2017 08:07 )             30.4                 [ ] Other Labs  [ ] None ordered            Please check or [place] when present:  •  Heart Failure:    [ ] Acute        [ ]  Acute on Chronic        [ ] Chronic         [ ] Diastolic     [ ]  Combined    •  MARBIN:     [ ] ATN        [ ]  Renal medullary necrosis       [ ]  Renal cortical necrosis                  [ ] Other pathological Lesion:  •  CKD:  [ ] Stage I   [ ] Stage II  [ ] Stage III    [ ]Stage IV   [ ]  CKD V   [ ]  Other/Unspecified:    •  Abdominal Nutritional Status:   [ ] Malnutrition-See Nutrition note    [ ] Cachexia   [ ]  Other        [ ] Supplement ordered:            [ ] Morbid Obesity: BMI >=40         ASSESSMENT/PLAN:      STATUS POST: Artificial Disc replacement L4-5, anterior spinal fusion L5-S1. POD 1    CONTINUE:          [ ] PT WBAT    [ ] DVT PPX-SCD    [ ] Pain Mgt - PM consulted.    [ ] Dispo plan-Pending PT eval.    Pt NPO until bowel sounds heard and + flatus.    Remove serrano later today. ORTHO NOTE    [x ] Pt seen/examined.  [ ] Pt without any complaints/in NAD.    [x ] Pt complains of: 10/10 pain, from abdominal incision radiating to low back, not well controlled despite meds given. No numbness, tingling or headache. No N/V/D. Denies flatus.      ROS: [ ] Fever  [ ] Chills  [ ] CP [ ] SOB [ ] Dysnea  [ ] Palpitations [ ] Cough [ ] N/V/C/D [ ] Paresthia [ ] Other     [ ] ROS  otherwise negative    .    PHYSICAL EXAM:    Vital Signs Last 24 Hrs  T(C): 37.1 (26 Oct 2017 08:34), Max: 37.1 (26 Oct 2017 08:34)  T(F): 98.7 (26 Oct 2017 08:34), Max: 98.7 (26 Oct 2017 08:34)  HR: 85 (26 Oct 2017 08:34) (73 - 124)  BP: 95/53 (26 Oct 2017 08:34) (93/64 - 141/52)  BP(mean): 70 (25 Oct 2017 16:00) (70 - 93)  RR: 15 (26 Oct 2017 08:34) (14 - 113)  SpO2: 99% (26 Oct 2017 08:34) (96% - 100%)    I&O's Detail    25 Oct 2017 07:01  -  26 Oct 2017 07:00  --------------------------------------------------------  IN:    lactated ringers.: 1750 mL  Total IN: 1750 mL    OUT:    Indwelling Catheter - Urethral: 2995 mL  Total OUT: 2995 mL    Total NET: -1245 mL           CAPILLARY BLOOD GLUCOSE                      Neuro: NAD AO x3    Lungs:    CV:    ABD: Soft NT ND. No bowel signs audible this morning. Dressing mildly saturated, serosanguinous fluid.    Ext:   RLE 5/5 FHL EHL GS TA  LLE 4/5 FHL EHL, 5/5 GS TA  Sensation decreased in left dorsum of foot.  WWP B/L LE    LABS   26 Oct 2017 08:07    142    |  106    |  7      ----------------------------<  110    4.0     |  26     |  0.69     Ca    9.0        26 Oct 2017 08:07                                   9.9    8.6   )-----------( 170      ( 26 Oct 2017 08:07 )             30.4                 [ ] Other Labs  [ ] None ordered            Please check or [place] when present:  •  Heart Failure:    [ ] Acute        [ ]  Acute on Chronic        [ ] Chronic         [ ] Diastolic     [ ]  Combined    •  MARBIN:     [ ] ATN        [ ]  Renal medullary necrosis       [ ]  Renal cortical necrosis                  [ ] Other pathological Lesion:  •  CKD:  [ ] Stage I   [ ] Stage II  [ ] Stage III    [ ]Stage IV   [ ]  CKD V   [ ]  Other/Unspecified:    •  Abdominal Nutritional Status:   [ ] Malnutrition-See Nutrition note    [ ] Cachexia   [ ]  Other        [ ] Supplement ordered:            [ ] Morbid Obesity: BMI >=40         ASSESSMENT/PLAN:      STATUS POST: Artificial Disc replacement L4-5, anterior spinal fusion L5-S1. POD 1    CONTINUE:          [ ] PT WBAT    [ ] DVT PPX-SCD    [ ] Pain Mgt - PM consulted.    [ ] Dispo plan-Pending PT eval.    Pt NPO until bowel sounds heard and + flatus.    Remove serrano later today.

## 2017-10-26 NOTE — CONSULT NOTE ADULT - SUBJECTIVE AND OBJECTIVE BOX
Pain Management Consult Note - [facility] [phone]    Chief Complaint: back pain    HPI: 30 yo F s/p Anterior spinal fusion L5-S1, ADR L4-L5 on 10/25    Pt seen on 10/26 due to inadequate pain control.  On assessment, pt appears extremely uncomfortable, shivering.  Pt seen at 3 pm - no concerns, pain adequately controlled.    Pain is __x_ sharp ____dull ___burning __x_achy ___ Intensity: ____ mild ___mod _x__severe     Location __x__surgical site ____cervical _____lumbar ____abd ____upper ext__x__lower ext    Worse with x____activity _x___movement _____physical therapy__x_ Rest    Improved with _x___medication ____rest ____physical therapy    ROS: Const:  _-__febrile   Eyes:__-_ENT:_-__CV: _-__chest pain  Resp: __-__sob  GI:_-__nausea _-__vomiting __x_abd pain ___npo ___clears __full diet __bm  :__-_ Musk: _x__pain back+right LE  _x__spasm  Skin:__-_ Neuro:  __-_nzhjcaeg__-_eooppmcxa__u_ numbness LLE _x__LLE weakness _x__LLE paresth  Psych:_x_anxiety  Endo:___ Heme:___Allergy:___-______, __x_all others reviewed and negative    PAST MEDICAL & SURGICAL HISTORY:  HNP (herniated nucleus pulposus), lumbar  History of tonsillectomy: age 6  H/O wisdom tooth extraction: (2013)      SH: _-__Tobacco   _x__Alcohol occasional                           FH:FAMILY HISTORY:   Breast cancer (Mother)  Family history of diabetes mellitus (Mother)      acetaminophen   Tablet 650 milliGRAM(s) Oral every 6 hours PRN  acetaminophen   Tablet 975 milliGRAM(s) Oral every 8 hours  aluminum hydroxide/magnesium hydroxide/simethicone Suspension 30 milliLiter(s) Oral every 12 hours PRN  BUpivacaine liposome 1.3% Injectable (no eMAR) 20 milliLiter(s) Local Injection once  docusate sodium 100 milliGRAM(s) Oral two times a day  famotidine    Tablet 20 milliGRAM(s) Oral every 12 hours  HYDROmorphone PCA (1 mG/mL) 30 milliLiter(s) PCA Continuous PCA Continuous  HYDROmorphone PCA (1 mG/mL) Rescue Clinician Bolus 0.5 milliGRAM(s) IV Push every 1 hour PRN  influenza   Vaccine 0.5 milliLiter(s) IntraMuscular once  lactated ringers. 1000 milliLiter(s) IV Continuous <Continuous>  metoclopramide Injectable 10 milliGRAM(s) IV Push every 6 hours PRN  naloxone Injectable 0.1 milliGRAM(s) IV Push every 3 minutes PRN  ondansetron Injectable 4 milliGRAM(s) IV Push every 6 hours PRN  scopolamine   Patch 1.5 milliGRAM(s) Transdermal every 3 days  senna 2 Tablet(s) Oral at bedtime      T(C): 37.6 (10-26-17 @ 15:56), Max: 37.6 (10-26-17 @ 15:56)  HR: 79 (10-26-17 @ 15:43) (73 - 90)  BP: 101/56 (10-26-17 @ 15:43) (91/52 - 120/75)  RR: 15 (10-26-17 @ 08:34) (15 - 21)  SpO2: 97% (10-26-17 @ 15:43) (96% - 100%)  Wt(kg): --    T(C): 37.6 (10-26-17 @ 15:56), Max: 37.6 (10-26-17 @ 15:56)  HR: 79 (10-26-17 @ 15:43) (73 - 90)  BP: 101/56 (10-26-17 @ 15:43) (91/52 - 120/75)  RR: 15 (10-26-17 @ 08:34) (15 - 21)  SpO2: 97% (10-26-17 @ 15:43) (96% - 100%)  Wt(kg): --    T(C): 37.6 (10-26-17 @ 15:56), Max: 37.6 (10-26-17 @ 15:56)  HR: 79 (10-26-17 @ 15:43) (73 - 90)  BP: 101/56 (10-26-17 @ 15:43) (91/52 - 120/75)  RR: 15 (10-26-17 @ 08:34) (15 - 21)  SpO2: 97% (10-26-17 @ 15:43) (96% - 100%)  Wt(kg): --    PHYSICAL EXAM:  Gen Appearance: _x__ acute distress ___appropriate        Neuro: _x__SILT feet___x_ EOM Intact Psych: AAOX__3, _x__mood/affect congruent      Eyes: _x__conjunctiva WNL  ___x__ Pupils equal and round        ENT: _x__ears and nose atraumatic___ Hearing grossly intact        Neck: ___trachea midline, no visible masses ___thyroid without palpable mass    Resp: _x__Nml WOB____No tactile fremitus ___clear to auscultation    Cardio: _x__extremities free from edema ____pedal pulses palpable    GI/Abdomen: ___soft ____x_ Nontender____x__distended_____HSM    Lymphatic: ___no palpable nodes in neck  ___no palpable nodes calves and feet    Skin/Wound: ___Incision, ___Dressing c/d/i,   ____surrounding tissues soft,  ___drain/chest tube present____    Muscular: EHL _5__/5  Gastrocnemius__5_/5    __x_absent clubbing/cyanosis      ASSESSMENT: This is a 31y old Female with a history of   M51.26  No h/o HF  Breast cancer (Mother)  Family history of diabetes mellitus (Mother)  Handoff  MEWS Score  HNP (herniated nucleus pulposus), lumbar  No pertinent past medical history  HNP (herniated nucleus pulposus), lumbar  HNP (herniated nucleus pulposus), lumbar  Spine exposure, circumferential, with vertebral resection, 1-2 segments  History of tonsillectomy  H/O wisdom tooth extraction      Recommended Treatment PLAN:    1. PCA dilaudid 0.2 + clinician rescue bolus 0.5 q 1 hr for severe breakthrough pain  2. Standing tylenol 975 mg po q8hrs Pain Management Consult Note - [facility] [phone]    Chief Complaint: back pain    HPI: 32 yo F s/p Anterior spinal fusion L5-S1, ADR L4-L5 on 10/25    Pt seen on 10/26 due to inadequate pain control.  On assessment, pt appears extremely uncomfortable, shivering.  Pt seen again at 3 pm - no concerns, pain adequately controlled.    Pain is __x_ sharp ____dull ___burning __x_achy ___ Intensity: ____ mild ___mod _x__severe     Location __x__surgical site ____cervical _____lumbar ____abd ____upper ext__x__lower ext    Worse with x____activity _x___movement _____physical therapy__x_ Rest    Improved with _x___medication ____rest ____physical therapy    ROS: Const:  _-__febrile   Eyes:__-_ENT:_-__CV: _-__chest pain  Resp: __-__sob  GI:_-__nausea _-__vomiting __x_abd pain ___npo ___clears __full diet __bm  :__-_ Musk: _x__pain back+right LE  _x__spasm  Skin:__-_ Neuro:  __-_pdqpimbi__-_dmgdnmdmo__s_ numbness LLE _x__LLE weakness _x__LLE paresth  Psych:_x_anxiety  Endo:___ Heme:___Allergy:___-______, __x_all others reviewed and negative    PAST MEDICAL & SURGICAL HISTORY:  HNP (herniated nucleus pulposus), lumbar  History of tonsillectomy: age 6  H/O wisdom tooth extraction: (2013)      SH: _-__Tobacco   _x__Alcohol occasional                           FH:FAMILY HISTORY:   Breast cancer (Mother)  Family history of diabetes mellitus (Mother)      acetaminophen   Tablet 650 milliGRAM(s) Oral every 6 hours PRN  acetaminophen   Tablet 975 milliGRAM(s) Oral every 8 hours  aluminum hydroxide/magnesium hydroxide/simethicone Suspension 30 milliLiter(s) Oral every 12 hours PRN  BUpivacaine liposome 1.3% Injectable (no eMAR) 20 milliLiter(s) Local Injection once  docusate sodium 100 milliGRAM(s) Oral two times a day  famotidine    Tablet 20 milliGRAM(s) Oral every 12 hours  HYDROmorphone PCA (1 mG/mL) 30 milliLiter(s) PCA Continuous PCA Continuous  HYDROmorphone PCA (1 mG/mL) Rescue Clinician Bolus 0.5 milliGRAM(s) IV Push every 1 hour PRN  influenza   Vaccine 0.5 milliLiter(s) IntraMuscular once  lactated ringers. 1000 milliLiter(s) IV Continuous <Continuous>  metoclopramide Injectable 10 milliGRAM(s) IV Push every 6 hours PRN  naloxone Injectable 0.1 milliGRAM(s) IV Push every 3 minutes PRN  ondansetron Injectable 4 milliGRAM(s) IV Push every 6 hours PRN  scopolamine   Patch 1.5 milliGRAM(s) Transdermal every 3 days  senna 2 Tablet(s) Oral at bedtime      T(C): 37.6 (10-26-17 @ 15:56), Max: 37.6 (10-26-17 @ 15:56)  HR: 79 (10-26-17 @ 15:43) (73 - 90)  BP: 101/56 (10-26-17 @ 15:43) (91/52 - 120/75)  RR: 15 (10-26-17 @ 08:34) (15 - 21)  SpO2: 97% (10-26-17 @ 15:43) (96% - 100%)  Wt(kg): --    T(C): 37.6 (10-26-17 @ 15:56), Max: 37.6 (10-26-17 @ 15:56)  HR: 79 (10-26-17 @ 15:43) (73 - 90)  BP: 101/56 (10-26-17 @ 15:43) (91/52 - 120/75)  RR: 15 (10-26-17 @ 08:34) (15 - 21)  SpO2: 97% (10-26-17 @ 15:43) (96% - 100%)  Wt(kg): --    T(C): 37.6 (10-26-17 @ 15:56), Max: 37.6 (10-26-17 @ 15:56)  HR: 79 (10-26-17 @ 15:43) (73 - 90)  BP: 101/56 (10-26-17 @ 15:43) (91/52 - 120/75)  RR: 15 (10-26-17 @ 08:34) (15 - 21)  SpO2: 97% (10-26-17 @ 15:43) (96% - 100%)  Wt(kg): --    PHYSICAL EXAM:  Gen Appearance: _x__ acute distress ___appropriate        Neuro: _x__SILT feet___x_ EOM Intact Psych: AAOX__3, _x__mood/affect congruent      Eyes: _x__conjunctiva WNL  ___x__ Pupils equal and round        ENT: _x__ears and nose atraumatic___ Hearing grossly intact        Neck: ___trachea midline, no visible masses ___thyroid without palpable mass    Resp: _x__Nml WOB____No tactile fremitus ___clear to auscultation    Cardio: _x__extremities free from edema ____pedal pulses palpable    GI/Abdomen: ___soft ____x_ Nontender____x__distended_____HSM    Lymphatic: ___no palpable nodes in neck  ___no palpable nodes calves and feet    Skin/Wound: ___Incision, ___Dressing c/d/i,   ____surrounding tissues soft,  ___drain/chest tube present____    Muscular: EHL _5__/5  Gastrocnemius__5_/5    __x_absent clubbing/cyanosis      ASSESSMENT: This is a 31y old Female with a history of   M51.26  No h/o HF  Breast cancer (Mother)  Family history of diabetes mellitus (Mother)  Handoff  MEWS Score  HNP (herniated nucleus pulposus), lumbar  No pertinent past medical history  HNP (herniated nucleus pulposus), lumbar  HNP (herniated nucleus pulposus), lumbar  Spine exposure, circumferential, with vertebral resection, 1-2 segments  History of tonsillectomy  H/O wisdom tooth extraction      Recommended Treatment PLAN:    1. PCA dilaudid 0.2 + clinician rescue bolus 0.5 q 1 hr for severe breakthrough pain  2. Standing tylenol 975 mg po q8hrs

## 2017-10-26 NOTE — DISCHARGE NOTE ADULT - CARE PLAN
Principal Discharge DX:	HNP (herniated nucleus pulposus), lumbar  Goal:	improvement s/p Artificial disc replacement L4-5, Anterior spinal fusion L5-S1  Instructions for follow-up, activity and diet:	see below

## 2017-10-26 NOTE — DISCHARGE NOTE ADULT - PATIENT PORTAL LINK FT
“You can access the [employer] Patient Portal, offered by [facility], by registering with the following website: [email or web address]”

## 2017-10-26 NOTE — DISCHARGE NOTE ADULT - MEDICATION SUMMARY - MEDICATIONS TO STOP TAKING
I will STOP taking the medications listed below when I get home from the hospital:    traMADol  -- 25mg combined with Diclofenac 25mg    Tylenol 8 HR Arthritis Pain 650 mg oral tablet, extended release  -- 2 tab(s) by mouth every 8 hours    naproxen 250 mg oral tablet  -- 1 tab(s) by mouth 2 times a day

## 2017-10-27 LAB
ANION GAP SERPL CALC-SCNC: 13 MMOL/L — SIGNIFICANT CHANGE UP (ref 5–17)
BASOPHILS NFR BLD AUTO: 0 % — SIGNIFICANT CHANGE UP (ref 0–2)
BUN SERPL-MCNC: 4 MG/DL — LOW (ref 7–23)
CALCIUM SERPL-MCNC: 8.6 MG/DL — SIGNIFICANT CHANGE UP (ref 8.4–10.5)
CHLORIDE SERPL-SCNC: 96 MMOL/L — SIGNIFICANT CHANGE UP (ref 96–108)
CO2 SERPL-SCNC: 25 MMOL/L — SIGNIFICANT CHANGE UP (ref 22–31)
CREAT SERPL-MCNC: 0.7 MG/DL — SIGNIFICANT CHANGE UP (ref 0.5–1.3)
EOSINOPHIL NFR BLD AUTO: 0.5 % — SIGNIFICANT CHANGE UP (ref 0–6)
GLUCOSE SERPL-MCNC: 99 MG/DL — SIGNIFICANT CHANGE UP (ref 70–99)
HCT VFR BLD CALC: 28.8 % — LOW (ref 34.5–45)
HGB BLD-MCNC: 9.8 G/DL — LOW (ref 11.5–15.5)
LYMPHOCYTES # BLD AUTO: 12.6 % — LOW (ref 13–44)
MCHC RBC-ENTMCNC: 29.6 PG — SIGNIFICANT CHANGE UP (ref 27–34)
MCHC RBC-ENTMCNC: 34 G/DL — SIGNIFICANT CHANGE UP (ref 32–36)
MCV RBC AUTO: 87 FL — SIGNIFICANT CHANGE UP (ref 80–100)
MONOCYTES NFR BLD AUTO: 8.9 % — SIGNIFICANT CHANGE UP (ref 2–14)
NEUTROPHILS NFR BLD AUTO: 78 % — HIGH (ref 43–77)
PLATELET # BLD AUTO: 148 K/UL — LOW (ref 150–400)
POTASSIUM SERPL-MCNC: 3.6 MMOL/L — SIGNIFICANT CHANGE UP (ref 3.5–5.3)
POTASSIUM SERPL-SCNC: 3.6 MMOL/L — SIGNIFICANT CHANGE UP (ref 3.5–5.3)
RBC # BLD: 3.31 M/UL — LOW (ref 3.8–5.2)
RBC # FLD: 12.7 % — SIGNIFICANT CHANGE UP (ref 10.3–16.9)
SODIUM SERPL-SCNC: 134 MMOL/L — LOW (ref 135–145)
WBC # BLD: 9.4 K/UL — SIGNIFICANT CHANGE UP (ref 3.8–10.5)
WBC # FLD AUTO: 9.4 K/UL — SIGNIFICANT CHANGE UP (ref 3.8–10.5)

## 2017-10-27 RX ORDER — HYDROMORPHONE HYDROCHLORIDE 2 MG/ML
2 INJECTION INTRAMUSCULAR; INTRAVENOUS; SUBCUTANEOUS EVERY 4 HOURS
Qty: 0 | Refills: 0 | Status: DISCONTINUED | OUTPATIENT
Start: 2017-10-27 | End: 2017-10-30

## 2017-10-27 RX ORDER — HYDROMORPHONE HYDROCHLORIDE 2 MG/ML
4 INJECTION INTRAMUSCULAR; INTRAVENOUS; SUBCUTANEOUS EVERY 4 HOURS
Qty: 0 | Refills: 0 | Status: DISCONTINUED | OUTPATIENT
Start: 2017-10-27 | End: 2017-10-30

## 2017-10-27 RX ORDER — HYDROMORPHONE HYDROCHLORIDE 2 MG/ML
0.5 INJECTION INTRAMUSCULAR; INTRAVENOUS; SUBCUTANEOUS
Qty: 0 | Refills: 0 | Status: DISCONTINUED | OUTPATIENT
Start: 2017-10-27 | End: 2017-10-29

## 2017-10-27 RX ADMIN — FAMOTIDINE 20 MILLIGRAM(S): 10 INJECTION INTRAVENOUS at 06:03

## 2017-10-27 RX ADMIN — HYDROMORPHONE HYDROCHLORIDE 4 MILLIGRAM(S): 2 INJECTION INTRAMUSCULAR; INTRAVENOUS; SUBCUTANEOUS at 15:50

## 2017-10-27 RX ADMIN — HYDROMORPHONE HYDROCHLORIDE 0.5 MILLIGRAM(S): 2 INJECTION INTRAMUSCULAR; INTRAVENOUS; SUBCUTANEOUS at 20:43

## 2017-10-27 RX ADMIN — Medication 975 MILLIGRAM(S): at 14:26

## 2017-10-27 RX ADMIN — ONDANSETRON 4 MILLIGRAM(S): 8 TABLET, FILM COATED ORAL at 20:54

## 2017-10-27 RX ADMIN — Medication 975 MILLIGRAM(S): at 06:03

## 2017-10-27 RX ADMIN — Medication 975 MILLIGRAM(S): at 20:44

## 2017-10-27 RX ADMIN — FAMOTIDINE 20 MILLIGRAM(S): 10 INJECTION INTRAVENOUS at 17:33

## 2017-10-27 RX ADMIN — HYDROMORPHONE HYDROCHLORIDE 4 MILLIGRAM(S): 2 INJECTION INTRAMUSCULAR; INTRAVENOUS; SUBCUTANEOUS at 15:18

## 2017-10-27 RX ADMIN — SENNA PLUS 2 TABLET(S): 8.6 TABLET ORAL at 20:43

## 2017-10-27 RX ADMIN — HYDROMORPHONE HYDROCHLORIDE 4 MILLIGRAM(S): 2 INJECTION INTRAMUSCULAR; INTRAVENOUS; SUBCUTANEOUS at 19:10

## 2017-10-27 RX ADMIN — HYDROMORPHONE HYDROCHLORIDE 0.5 MILLIGRAM(S): 2 INJECTION INTRAMUSCULAR; INTRAVENOUS; SUBCUTANEOUS at 21:00

## 2017-10-27 RX ADMIN — Medication 100 MILLIGRAM(S): at 17:33

## 2017-10-27 RX ADMIN — Medication 100 MILLIGRAM(S): at 06:03

## 2017-10-27 RX ADMIN — HYDROMORPHONE HYDROCHLORIDE 4 MILLIGRAM(S): 2 INJECTION INTRAMUSCULAR; INTRAVENOUS; SUBCUTANEOUS at 19:50

## 2017-10-27 NOTE — PROGRESS NOTE ADULT - SUBJECTIVE AND OBJECTIVE BOX
SUBJECTIVE: Pt seen and examined at bedside. No overnight events.  Pain controlled.  Vital Signs Last 24 Hrs  T(C): 37.1 (27 Oct 2017 09:57), Max: 37.8 (27 Oct 2017 06:10)  T(F): 98.7 (27 Oct 2017 09:57), Max: 100 (27 Oct 2017 06:10)  HR: 98 (27 Oct 2017 09:57) (79 - 103)  BP: 112/78 (27 Oct 2017 09:57) (91/52 - 123/66)  BP(mean): --  RR: 15 (27 Oct 2017 09:57) (15 - 16)  SpO2: 99% (27 Oct 2017 09:57) (96% - 100%)    PHYSICAL EXAM    Gen: NAD  CV: RRR  Pulm: no resp distress  Abd: Soft, appropriately tender, dressings CDI  Ext: WWP, motor, sensory strength intact b/l  Vasc: palpable b/l dp/pt pulses    I&O's Detail    26 Oct 2017 07:01  -  27 Oct 2017 07:00  --------------------------------------------------------  IN:    lactated ringers.: 1375 mL  Total IN: 1375 mL    OUT:    Indwelling Catheter - Urethral: 3250 mL  Total OUT: 3250 mL    Total NET: -1875 mL      27 Oct 2017 07:01  -  27 Oct 2017 13:26  --------------------------------------------------------  IN:    lactated ringers.: 500 mL    Oral Fluid: 300 mL  Total IN: 800 mL    OUT:    Voided: 300 mL  Total OUT: 300 mL    Total NET: 500 mL          LABS:                        9.8    9.4   )-----------( 148      ( 27 Oct 2017 07:07 )             28.8     10-27    134<L>  |  96  |  4<L>  ----------------------------<  99  3.6   |  25  |  0.70    Ca    8.6      27 Oct 2017 07:07            MEDICATIONS  (STANDING):  acetaminophen   Tablet 975 milliGRAM(s) Oral every 8 hours  BUpivacaine liposome 1.3% Injectable (no eMAR) 20 milliLiter(s) Local Injection once  docusate sodium 100 milliGRAM(s) Oral two times a day  famotidine    Tablet 20 milliGRAM(s) Oral every 12 hours  HYDROmorphone PCA (1 mG/mL) 30 milliLiter(s) PCA Continuous PCA Continuous  influenza   Vaccine 0.5 milliLiter(s) IntraMuscular once  lactated ringers. 1000 milliLiter(s) (125 mL/Hr) IV Continuous <Continuous>  scopolamine   Patch 1.5 milliGRAM(s) Transdermal every 3 days  senna 2 Tablet(s) Oral at bedtime    MEDICATIONS  (PRN):  acetaminophen   Tablet 650 milliGRAM(s) Oral every 6 hours PRN For Temp greater than 38 C (100.4 F)  aluminum hydroxide/magnesium hydroxide/simethicone Suspension 30 milliLiter(s) Oral every 12 hours PRN Indigestion  HYDROmorphone PCA (1 mG/mL) Rescue Clinician Bolus 0.5 milliGRAM(s) IV Push every 1 hour PRN for Pain Scale GREATER THAN 6  metoclopramide Injectable 10 milliGRAM(s) IV Push every 6 hours PRN nausea/vomiting  naloxone Injectable 0.1 milliGRAM(s) IV Push every 3 minutes PRN For ANY of the following changes in patient status:  A. RR LESS THAN 10 breaths per minute, B. Oxygen saturation LESS THAN 90%, C. Sedation score of 6  ondansetron Injectable 4 milliGRAM(s) IV Push every 6 hours PRN Nausea      RADIOLOGY & ADDITIONAL STUDIES:    ASSESSMENT AND PLAN  32 yo F POD#2 s/p Anterior spinal fusion L5-S1, ADR L4-L5  -adv diet when having BF  -palpable distal pulses b/l  -care per ortho  -will sign off, reconsult as needed

## 2017-10-27 NOTE — PROGRESS NOTE ADULT - SUBJECTIVE AND OBJECTIVE BOX
Pt. seen at 0954  Pain Management Progress Note - [facility] [phone]    HPI:  Pt. complains of abdominal pain that she describes as burning.  States pain is well controlled with Dilaudid PCA.  Pt. states she has taken oxycodone in the past and it is not helpful.  Denies N/V. +flatus.   Complains of numbness in the left leg and foot that was present pre op.            Pertinent PMH: Pain at: _x__Back ___Neck___Knee ___Hip ___Shoulder ___ Opioid tolerance    Pain is ___ sharp ____dull x___burning ___achy ___ Intensity: ____ mild ____mod ____severe     Location __x___surgical site _____cervical _____lumbar __x__abd _____upper ext____lower ext    Worse with _x___activity _x___movement _____physical therapy___ Rest    Improved with __x__medication __x__rest ____physical therapy    lactated ringers.  acetaminophen   Tablet  HYDROmorphone  Injectable  ceFAZolin   IVPB  aluminum hydroxide/magnesium hydroxide/simethicone Suspension  famotidine    Tablet  ondansetron Injectable  docusate sodium  senna  famotidine Injectable  metoclopramide Injectable  scopolamine   Patch  naloxone Injectable  acetaminophen   Tablet  HYDROmorphone   Tablet  HYDROmorphone   Tablet  HYDROmorphone  Injectable      ROS: Const:  ___febrile   Eyes:___ENT:___CV: ___chest pain  Resp: ____sob  GI:_-__nausea _-__vomiting _+___abd pain ___npo _+__clears ___full diet __bm  :___ Musk: ___pain ___spasm  Skin:___ Neuro:  __-_wkrnyqhw_-__vbxjvipga__+ (left leg-present preop)__ numbness __-_weakness ___paresthesia  Psych:___anxiety  Endo:___ Heme:___Allergy:___      10-27 @ 07:17988 mL/min/1.73M2          Hemoglobin: 9.8 g/dL (10-27 @ 07:07)  Hemoglobin: 9.9 g/dL (10-26 @ 08:07)  Hemoglobin: 11.2 g/dL (10-25 @ 15:00)        T(C): 37.1 (10-27-17 @ 09:57), Max: 37.8 (10-27-17 @ 06:10)  HR: 98 (10-27-17 @ 09:57) (79 - 103)  BP: 112/78 (10-27-17 @ 09:57) (91/52 - 123/66)  RR: 15 (10-27-17 @ 09:57) (15 - 16)  SpO2: 99% (10-27-17 @ 09:57) (96% - 100%)  Wt(kg): --     PHYSICAL EXAM:  Gen Appearance: _x__no acute distress __x_appropriate         Neuro: _x__SILT R foot, decreased sensation L leg/foot_x___ EOM Intact Psych: AAOX3__, _x__mood/affect appropriate        Eyes: _x__conjunctiva WNL  __x___ Pupils equal and round        ENT: _x__ears and nose atraumatic__x_ Hearing grossly intact        Neck: ___trachea midline, no visible masses ___thyroid without palpable mass    Resp: _x__Nml WOB____No tactile fremitus ___clear to auscultation    Cardio: ___extremities free from edema ____pedal pulses palpable    GI/Abdomen: _x__soft __x___ Nontender______Nondistended_____HSM    Lymphatic: ___no palpable nodes in neck  ___no palpable nodes calves and feet    Skin/Wound: ___Incision, ___Dressing c/d/i,   ____surrounding tissues soft,  ___drain/chest tube present____    Muscular: EHL __5_/5  Gastrocnemius___/5    _x__absent clubbing/cyanosis         ASSESSMENT:  This is a 31y old Female with a history of:  Breast cancer (Mother)  Family history of diabetes mellitus (Mother)  HNP (herniated nucleus pulposus), lumbar  Spine exposure, circumferential, with vertebral resection, 1-2 segments  History of tonsillectomy  H/O wisdom tooth extraction  and abdominal pain S/P anterior spinal fusion L5-S1, artificial disc replacement L4-L5 and is doing better today.      Recommended Treatment PLAN:  1.  D/C PCA  2.  Dilaudid 2-4 mg po q4h prn  3.  Dilaudid 0.5 mg IV q2h prn  4.  Tylenol 975 mg po q8h

## 2017-10-27 NOTE — PROGRESS NOTE ADULT - SUBJECTIVE AND OBJECTIVE BOX
Orthopaedic Spine Note    S: No acute events overnight.     Vital Signs Last 24 Hrs  T(C): 37.1 (27 Oct 2017 09:57), Max: 37.8 (27 Oct 2017 06:10)  T(F): 98.7 (27 Oct 2017 09:57), Max: 100 (27 Oct 2017 06:10)  HR: 98 (27 Oct 2017 09:57) (79 - 103)  BP: 112/78 (27 Oct 2017 09:57) (91/52 - 123/66)  BP(mean): --  RR: 15 (27 Oct 2017 09:57) (15 - 16)  SpO2: 99% (27 Oct 2017 09:57) (96% - 100%)    I&O's Summary    26 Oct 2017 07:01  -  27 Oct 2017 07:00  --------------------------------------------------------  IN: 1375 mL / OUT: 3250 mL / NET: -1875 mL    27 Oct 2017 07:01  -  27 Oct 2017 10:34  --------------------------------------------------------  IN: 125 mL / OUT: 0 mL / NET: 125 mL        Phys Exam  Abd dressing CDI  (L) Lower Extremity   Motor  - Hip flexors: 5/5  - Quads: 5/5  - Hamstrings: 5/5  - TA: 5/5  - EHL: 5/5  - GSC: 5/5  SILT L1 - S2 dermatomal distribution  WWP toes. DP and PT pulse 2+    (R) Lower Extremity   Motor  - Hip flexors: 5/5  - Quads: 5/5  - Hamstrings: 5/5  - TA: 5/5  - EHL: 5/5  - GSC: 5/5  SILT L1 - S2 dermatomal distribution  WWP toes. DP and PT pulse 2+               9.8    9.4   )-----------( 148      ( 27 Oct 2017 07:07 )             28.8     10-27    134<L>  |  96  |  4<L>  ----------------------------<  99  3.6   |  25  |  0.70    Ca    8.6      27 Oct 2017 07:07          MEDICATIONS  (STANDING):  acetaminophen   Tablet 975 milliGRAM(s) Oral every 8 hours  BUpivacaine liposome 1.3% Injectable (no eMAR) 20 milliLiter(s) Local Injection once  docusate sodium 100 milliGRAM(s) Oral two times a day  famotidine    Tablet 20 milliGRAM(s) Oral every 12 hours  HYDROmorphone PCA (1 mG/mL) 30 milliLiter(s) PCA Continuous PCA Continuous  influenza   Vaccine 0.5 milliLiter(s) IntraMuscular once  lactated ringers. 1000 milliLiter(s) (125 mL/Hr) IV Continuous <Continuous>  scopolamine   Patch 1.5 milliGRAM(s) Transdermal every 3 days  senna 2 Tablet(s) Oral at bedtime    MEDICATIONS  (PRN):  acetaminophen   Tablet 650 milliGRAM(s) Oral every 6 hours PRN For Temp greater than 38 C (100.4 F)  aluminum hydroxide/magnesium hydroxide/simethicone Suspension 30 milliLiter(s) Oral every 12 hours PRN Indigestion  HYDROmorphone PCA (1 mG/mL) Rescue Clinician Bolus 0.5 milliGRAM(s) IV Push every 1 hour PRN for Pain Scale GREATER THAN 6  metoclopramide Injectable 10 milliGRAM(s) IV Push every 6 hours PRN nausea/vomiting  naloxone Injectable 0.1 milliGRAM(s) IV Push every 3 minutes PRN For ANY of the following changes in patient status:  A. RR LESS THAN 10 breaths per minute, B. Oxygen saturation LESS THAN 90%, C. Sedation score of 6  ondansetron Injectable 4 milliGRAM(s) IV Push every 6 hours PRN Nausea      Assess / Plan   31y year old Female s/p Artificial Disc Replacement L4-5, L5-S1, POD 2  - WBAT  - OOB w PT  - DVT ppx  - Pain control  - Bella-op antibiotics  - Advance diet, passing flatus  - Dispo planning

## 2017-10-28 LAB
ANION GAP SERPL CALC-SCNC: 13 MMOL/L — SIGNIFICANT CHANGE UP (ref 5–17)
BASOPHILS NFR BLD AUTO: 0.1 % — SIGNIFICANT CHANGE UP (ref 0–2)
BUN SERPL-MCNC: 4 MG/DL — LOW (ref 7–23)
CALCIUM SERPL-MCNC: 8.8 MG/DL — SIGNIFICANT CHANGE UP (ref 8.4–10.5)
CHLORIDE SERPL-SCNC: 96 MMOL/L — SIGNIFICANT CHANGE UP (ref 96–108)
CO2 SERPL-SCNC: 25 MMOL/L — SIGNIFICANT CHANGE UP (ref 22–31)
CREAT SERPL-MCNC: 0.62 MG/DL — SIGNIFICANT CHANGE UP (ref 0.5–1.3)
EOSINOPHIL NFR BLD AUTO: 1.7 % — SIGNIFICANT CHANGE UP (ref 0–6)
GLUCOSE SERPL-MCNC: 101 MG/DL — HIGH (ref 70–99)
HCT VFR BLD CALC: 29 % — LOW (ref 34.5–45)
HGB BLD-MCNC: 9.9 G/DL — LOW (ref 11.5–15.5)
LYMPHOCYTES # BLD AUTO: 17.7 % — SIGNIFICANT CHANGE UP (ref 13–44)
MCHC RBC-ENTMCNC: 29.6 PG — SIGNIFICANT CHANGE UP (ref 27–34)
MCHC RBC-ENTMCNC: 34.1 G/DL — SIGNIFICANT CHANGE UP (ref 32–36)
MCV RBC AUTO: 86.6 FL — SIGNIFICANT CHANGE UP (ref 80–100)
MONOCYTES NFR BLD AUTO: 6.8 % — SIGNIFICANT CHANGE UP (ref 2–14)
NEUTROPHILS NFR BLD AUTO: 73.7 % — SIGNIFICANT CHANGE UP (ref 43–77)
PLATELET # BLD AUTO: 171 K/UL — SIGNIFICANT CHANGE UP (ref 150–400)
POTASSIUM SERPL-MCNC: 3.3 MMOL/L — LOW (ref 3.5–5.3)
POTASSIUM SERPL-SCNC: 3.3 MMOL/L — LOW (ref 3.5–5.3)
RBC # BLD: 3.35 M/UL — LOW (ref 3.8–5.2)
RBC # FLD: 12.9 % — SIGNIFICANT CHANGE UP (ref 10.3–16.9)
SODIUM SERPL-SCNC: 134 MMOL/L — LOW (ref 135–145)
WBC # BLD: 7 K/UL — SIGNIFICANT CHANGE UP (ref 3.8–10.5)
WBC # FLD AUTO: 7 K/UL — SIGNIFICANT CHANGE UP (ref 3.8–10.5)

## 2017-10-28 RX ORDER — TRAMADOL HYDROCHLORIDE 50 MG/1
50 TABLET ORAL EVERY 8 HOURS
Qty: 0 | Refills: 0 | Status: DISCONTINUED | OUTPATIENT
Start: 2017-10-28 | End: 2017-10-30

## 2017-10-28 RX ADMIN — HYDROMORPHONE HYDROCHLORIDE 0.5 MILLIGRAM(S): 2 INJECTION INTRAMUSCULAR; INTRAVENOUS; SUBCUTANEOUS at 05:30

## 2017-10-28 RX ADMIN — Medication 100 MILLIGRAM(S): at 06:07

## 2017-10-28 RX ADMIN — HYDROMORPHONE HYDROCHLORIDE 4 MILLIGRAM(S): 2 INJECTION INTRAMUSCULAR; INTRAVENOUS; SUBCUTANEOUS at 03:28

## 2017-10-28 RX ADMIN — HYDROMORPHONE HYDROCHLORIDE 0.5 MILLIGRAM(S): 2 INJECTION INTRAMUSCULAR; INTRAVENOUS; SUBCUTANEOUS at 05:02

## 2017-10-28 RX ADMIN — FAMOTIDINE 20 MILLIGRAM(S): 10 INJECTION INTRAVENOUS at 06:07

## 2017-10-28 RX ADMIN — Medication 100 MILLIGRAM(S): at 17:23

## 2017-10-28 RX ADMIN — TRAMADOL HYDROCHLORIDE 50 MILLIGRAM(S): 50 TABLET ORAL at 22:15

## 2017-10-28 RX ADMIN — Medication 975 MILLIGRAM(S): at 06:07

## 2017-10-28 RX ADMIN — FAMOTIDINE 20 MILLIGRAM(S): 10 INJECTION INTRAVENOUS at 17:23

## 2017-10-28 RX ADMIN — HYDROMORPHONE HYDROCHLORIDE 0.5 MILLIGRAM(S): 2 INJECTION INTRAMUSCULAR; INTRAVENOUS; SUBCUTANEOUS at 13:56

## 2017-10-28 RX ADMIN — Medication 975 MILLIGRAM(S): at 21:45

## 2017-10-28 RX ADMIN — Medication 975 MILLIGRAM(S): at 13:57

## 2017-10-28 RX ADMIN — SENNA PLUS 2 TABLET(S): 8.6 TABLET ORAL at 21:45

## 2017-10-28 RX ADMIN — HYDROMORPHONE HYDROCHLORIDE 4 MILLIGRAM(S): 2 INJECTION INTRAMUSCULAR; INTRAVENOUS; SUBCUTANEOUS at 04:00

## 2017-10-28 RX ADMIN — HYDROMORPHONE HYDROCHLORIDE 0.5 MILLIGRAM(S): 2 INJECTION INTRAMUSCULAR; INTRAVENOUS; SUBCUTANEOUS at 14:14

## 2017-10-28 RX ADMIN — HYDROMORPHONE HYDROCHLORIDE 4 MILLIGRAM(S): 2 INJECTION INTRAMUSCULAR; INTRAVENOUS; SUBCUTANEOUS at 09:06

## 2017-10-28 RX ADMIN — TRAMADOL HYDROCHLORIDE 50 MILLIGRAM(S): 50 TABLET ORAL at 21:45

## 2017-10-28 RX ADMIN — HYDROMORPHONE HYDROCHLORIDE 4 MILLIGRAM(S): 2 INJECTION INTRAMUSCULAR; INTRAVENOUS; SUBCUTANEOUS at 10:00

## 2017-10-28 NOTE — PROGRESS NOTE ADULT - SUBJECTIVE AND OBJECTIVE BOX
Orthopaedic Spine Note    S: No acute events overnight.     Vital Signs Last 24 Hrs  T(C): 37.1 (27 Oct 2017 09:57), Max: 37.8 (27 Oct 2017 06:10)  T(F): 98.7 (27 Oct 2017 09:57), Max: 100 (27 Oct 2017 06:10)  HR: 98 (27 Oct 2017 09:57) (79 - 103)  BP: 112/78 (27 Oct 2017 09:57) (91/52 - 123/66)  BP(mean): --  RR: 15 (27 Oct 2017 09:57) (15 - 16)  SpO2: 99% (27 Oct 2017 09:57) (96% - 100%)    I&O's Summary    26 Oct 2017 07:01  -  27 Oct 2017 07:00  --------------------------------------------------------  IN: 1375 mL / OUT: 3250 mL / NET: -1875 mL    27 Oct 2017 07:01  -  27 Oct 2017 10:34  --------------------------------------------------------  IN: 125 mL / OUT: 0 mL / NET: 125 mL        Phys Exam  Abd dressing CDI  (L) Lower Extremity   Motor  - Hip flexors: 5/5  - Quads: 5/5  - Hamstrings: 5/5  - TA: 5/5  - EHL: 5/5  - GSC: 5/5  SILT L1 - S2 dermatomal distribution  WWP toes. DP and PT pulse 2+    (R) Lower Extremity   Motor  - Hip flexors: 5/5  - Quads: 5/5  - Hamstrings: 5/5  - TA: 5/5  - EHL: 5/5  - GSC: 5/5  SILT L1 - S2 dermatomal distribution  WWP toes. DP and PT pulse 2+               9.8    9.4   )-----------( 148      ( 27 Oct 2017 07:07 )             28.8     10-27    134<L>  |  96  |  4<L>  ----------------------------<  99  3.6   |  25  |  0.70    Ca    8.6      27 Oct 2017 07:07          MEDICATIONS  (STANDING):  acetaminophen   Tablet 975 milliGRAM(s) Oral every 8 hours  BUpivacaine liposome 1.3% Injectable (no eMAR) 20 milliLiter(s) Local Injection once  docusate sodium 100 milliGRAM(s) Oral two times a day  famotidine    Tablet 20 milliGRAM(s) Oral every 12 hours  HYDROmorphone PCA (1 mG/mL) 30 milliLiter(s) PCA Continuous PCA Continuous  influenza   Vaccine 0.5 milliLiter(s) IntraMuscular once  lactated ringers. 1000 milliLiter(s) (125 mL/Hr) IV Continuous <Continuous>  scopolamine   Patch 1.5 milliGRAM(s) Transdermal every 3 days  senna 2 Tablet(s) Oral at bedtime    MEDICATIONS  (PRN):  acetaminophen   Tablet 650 milliGRAM(s) Oral every 6 hours PRN For Temp greater than 38 C (100.4 F)  aluminum hydroxide/magnesium hydroxide/simethicone Suspension 30 milliLiter(s) Oral every 12 hours PRN Indigestion  HYDROmorphone PCA (1 mG/mL) Rescue Clinician Bolus 0.5 milliGRAM(s) IV Push every 1 hour PRN for Pain Scale GREATER THAN 6  metoclopramide Injectable 10 milliGRAM(s) IV Push every 6 hours PRN nausea/vomiting  naloxone Injectable 0.1 milliGRAM(s) IV Push every 3 minutes PRN For ANY of the following changes in patient status:  A. RR LESS THAN 10 breaths per minute, B. Oxygen saturation LESS THAN 90%, C. Sedation score of 6  ondansetron Injectable 4 milliGRAM(s) IV Push every 6 hours PRN Nausea      Assess / Plan   31y year old Female s/p Artificial Disc Replacement L4-5, L5-S1, POD 3  - WBAT  - OOB w PT  - DVT ppx  - Pain control  - Bella-op antibiotics  - Advance diet, passing flatus  - Dispo planning Orthopaedic Spine Note    S: No acute events overnight. passing flatus/BMs    Vital Signs Last 24 Hrs  T(C): 37.1 (27 Oct 2017 09:57), Max: 37.8 (27 Oct 2017 06:10)  T(F): 98.7 (27 Oct 2017 09:57), Max: 100 (27 Oct 2017 06:10)  HR: 98 (27 Oct 2017 09:57) (79 - 103)  BP: 112/78 (27 Oct 2017 09:57) (91/52 - 123/66)  BP(mean): --  RR: 15 (27 Oct 2017 09:57) (15 - 16)  SpO2: 99% (27 Oct 2017 09:57) (96% - 100%)    I&O's Summary    26 Oct 2017 07:01  -  27 Oct 2017 07:00  --------------------------------------------------------  IN: 1375 mL / OUT: 3250 mL / NET: -1875 mL    27 Oct 2017 07:01  -  27 Oct 2017 10:34  --------------------------------------------------------  IN: 125 mL / OUT: 0 mL / NET: 125 mL        Phys Exam  Abd dressing CDI  (L) Lower Extremity   Motor  - Hip flexors: 5/5  - Quads: 5/5  - Hamstrings: 5/5  - TA: 5/5  - EHL: 5/5  - GSC: 5/5  SILT L1 - S2 dermatomal distribution  WWP toes. DP and PT pulse 2+    (R) Lower Extremity   Motor  - Hip flexors: 5/5  - Quads: 5/5  - Hamstrings: 5/5  - TA: 5/5  - EHL: 5/5  - GSC: 5/5  SILT L1 - S2 dermatomal distribution  WWP toes. DP and PT pulse 2+               9.8    9.4   )-----------( 148      ( 27 Oct 2017 07:07 )             28.8     10-27    134<L>  |  96  |  4<L>  ----------------------------<  99  3.6   |  25  |  0.70    Ca    8.6      27 Oct 2017 07:07          MEDICATIONS  (STANDING):  acetaminophen   Tablet 975 milliGRAM(s) Oral every 8 hours  BUpivacaine liposome 1.3% Injectable (no eMAR) 20 milliLiter(s) Local Injection once  docusate sodium 100 milliGRAM(s) Oral two times a day  famotidine    Tablet 20 milliGRAM(s) Oral every 12 hours  HYDROmorphone PCA (1 mG/mL) 30 milliLiter(s) PCA Continuous PCA Continuous  influenza   Vaccine 0.5 milliLiter(s) IntraMuscular once  lactated ringers. 1000 milliLiter(s) (125 mL/Hr) IV Continuous <Continuous>  scopolamine   Patch 1.5 milliGRAM(s) Transdermal every 3 days  senna 2 Tablet(s) Oral at bedtime    MEDICATIONS  (PRN):  acetaminophen   Tablet 650 milliGRAM(s) Oral every 6 hours PRN For Temp greater than 38 C (100.4 F)  aluminum hydroxide/magnesium hydroxide/simethicone Suspension 30 milliLiter(s) Oral every 12 hours PRN Indigestion  HYDROmorphone PCA (1 mG/mL) Rescue Clinician Bolus 0.5 milliGRAM(s) IV Push every 1 hour PRN for Pain Scale GREATER THAN 6  metoclopramide Injectable 10 milliGRAM(s) IV Push every 6 hours PRN nausea/vomiting  naloxone Injectable 0.1 milliGRAM(s) IV Push every 3 minutes PRN For ANY of the following changes in patient status:  A. RR LESS THAN 10 breaths per minute, B. Oxygen saturation LESS THAN 90%, C. Sedation score of 6  ondansetron Injectable 4 milliGRAM(s) IV Push every 6 hours PRN Nausea      Assess / Plan   31y year old Female s/p Artificial Disc Replacement L4-5, L5-S1, POD 3  - WBAT  - OOB w PT  - DVT ppx  - Pain control  - Bella-op antibiotics  - Advance diet, passing flatus  - Dispo planning

## 2017-10-29 RX ORDER — TRAMADOL HYDROCHLORIDE 50 MG/1
25 TABLET ORAL ONCE
Qty: 0 | Refills: 0 | Status: DISCONTINUED | OUTPATIENT
Start: 2017-10-29 | End: 2017-10-30

## 2017-10-29 RX ADMIN — TRAMADOL HYDROCHLORIDE 50 MILLIGRAM(S): 50 TABLET ORAL at 06:02

## 2017-10-29 RX ADMIN — HYDROMORPHONE HYDROCHLORIDE 4 MILLIGRAM(S): 2 INJECTION INTRAMUSCULAR; INTRAVENOUS; SUBCUTANEOUS at 04:03

## 2017-10-29 RX ADMIN — SCOPALAMINE 1.5 MILLIGRAM(S): 1 PATCH, EXTENDED RELEASE TRANSDERMAL at 12:34

## 2017-10-29 RX ADMIN — TRAMADOL HYDROCHLORIDE 50 MILLIGRAM(S): 50 TABLET ORAL at 14:29

## 2017-10-29 RX ADMIN — TRAMADOL HYDROCHLORIDE 50 MILLIGRAM(S): 50 TABLET ORAL at 22:30

## 2017-10-29 RX ADMIN — Medication 975 MILLIGRAM(S): at 05:17

## 2017-10-29 RX ADMIN — Medication 100 MILLIGRAM(S): at 17:24

## 2017-10-29 RX ADMIN — Medication 975 MILLIGRAM(S): at 21:51

## 2017-10-29 RX ADMIN — TRAMADOL HYDROCHLORIDE 50 MILLIGRAM(S): 50 TABLET ORAL at 21:51

## 2017-10-29 RX ADMIN — Medication 100 MILLIGRAM(S): at 05:17

## 2017-10-29 RX ADMIN — FAMOTIDINE 20 MILLIGRAM(S): 10 INJECTION INTRAVENOUS at 05:16

## 2017-10-29 RX ADMIN — Medication 10 MILLIGRAM(S): at 05:58

## 2017-10-29 RX ADMIN — Medication 975 MILLIGRAM(S): at 14:56

## 2017-10-29 RX ADMIN — TRAMADOL HYDROCHLORIDE 50 MILLIGRAM(S): 50 TABLET ORAL at 13:28

## 2017-10-29 RX ADMIN — SENNA PLUS 2 TABLET(S): 8.6 TABLET ORAL at 21:50

## 2017-10-29 RX ADMIN — TRAMADOL HYDROCHLORIDE 50 MILLIGRAM(S): 50 TABLET ORAL at 05:17

## 2017-10-29 RX ADMIN — FAMOTIDINE 20 MILLIGRAM(S): 10 INJECTION INTRAVENOUS at 17:23

## 2017-10-29 NOTE — PROGRESS NOTE ADULT - SUBJECTIVE AND OBJECTIVE BOX
Orthopaedic Spine Note    S: No acute events overnight. passing flatus/BMs    Vital Signs Last 24 Hrs  T(C): 36.9 (29 Oct 2017 05:13), Max: 37.3 (28 Oct 2017 15:38)  T(F): 98.4 (29 Oct 2017 05:13), Max: 99.1 (28 Oct 2017 15:38)  HR: 85 (29 Oct 2017 09:16) (85 - 99)  BP: 103/62 (29 Oct 2017 09:16) (94/59 - 117/79)  BP(mean): --  RR: 14 (29 Oct 2017 09:16) (14 - 18)  SpO2: 97% (29 Oct 2017 09:16) (97% - 100%)    II&O's Detail    28 Oct 2017 07:01  -  29 Oct 2017 07:00  --------------------------------------------------------  IN:    Oral Fluid: 1080 mL  Total IN: 1080 mL    OUT:    Voided: 950 mL  Total OUT: 950 mL    Total NET: 130 mL              Phys Exam  Abd dressing CDI  (L) Lower Extremity   Motor  - Hip flexors: 5/5  - Quads: 5/5  - Hamstrings: 5/5  - TA: 5/5  - EHL: 5/5  - GSC: 5/5  SILT L1 - S2 dermatomal distribution  WWP toes. DP and PT pulse 2+    (R) Lower Extremity   Motor  - Hip flexors: 5/5  - Quads: 5/5  - Hamstrings: 5/5  - TA: 5/5  - EHL: 5/5  - GSC: 5/5  SILT L1 - S2 dermatomal distribution  WWP toes. DP and PT pulse 2+               9.8    9.4   )-----------( 148      ( 27 Oct 2017 07:07 )             28.8     10-27    134<L>  |  96  |  4<L>  ----------------------------<  99  3.6   |  25  |  0.70    Ca    8.6      27 Oct 2017 07:07          MEDICATIONS  (STANDING):  acetaminophen   Tablet 975 milliGRAM(s) Oral every 8 hours  BUpivacaine liposome 1.3% Injectable (no eMAR) 20 milliLiter(s) Local Injection once  docusate sodium 100 milliGRAM(s) Oral two times a day  famotidine    Tablet 20 milliGRAM(s) Oral every 12 hours  HYDROmorphone PCA (1 mG/mL) 30 milliLiter(s) PCA Continuous PCA Continuous  influenza   Vaccine 0.5 milliLiter(s) IntraMuscular once  lactated ringers. 1000 milliLiter(s) (125 mL/Hr) IV Continuous <Continuous>  scopolamine   Patch 1.5 milliGRAM(s) Transdermal every 3 days  senna 2 Tablet(s) Oral at bedtime    MEDICATIONS  (PRN):  acetaminophen   Tablet 650 milliGRAM(s) Oral every 6 hours PRN For Temp greater than 38 C (100.4 F)  aluminum hydroxide/magnesium hydroxide/simethicone Suspension 30 milliLiter(s) Oral every 12 hours PRN Indigestion  HYDROmorphone PCA (1 mG/mL) Rescue Clinician Bolus 0.5 milliGRAM(s) IV Push every 1 hour PRN for Pain Scale GREATER THAN 6  metoclopramide Injectable 10 milliGRAM(s) IV Push every 6 hours PRN nausea/vomiting  naloxone Injectable 0.1 milliGRAM(s) IV Push every 3 minutes PRN For ANY of the following changes in patient status:  A. RR LESS THAN 10 breaths per minute, B. Oxygen saturation LESS THAN 90%, C. Sedation score of 6  ondansetron Injectable 4 milliGRAM(s) IV Push every 6 hours PRN Nausea      Assess / Plan   31y year old Female s/p Artificial Disc Replacement L4-5, L5-S1, POD 4  - WBAT  - OOB w PT  - DVT ppx  - Pain control  - Bella-op antibiotics  - Advanced diet, passing flatus/BM  - Dispo planning

## 2017-10-30 VITALS
HEART RATE: 79 BPM | TEMPERATURE: 99 F | RESPIRATION RATE: 16 BRPM | DIASTOLIC BLOOD PRESSURE: 68 MMHG | SYSTOLIC BLOOD PRESSURE: 103 MMHG | OXYGEN SATURATION: 100 %

## 2017-10-30 LAB
ANION GAP SERPL CALC-SCNC: 14 MMOL/L — SIGNIFICANT CHANGE UP (ref 5–17)
BUN SERPL-MCNC: 6 MG/DL — LOW (ref 7–23)
CALCIUM SERPL-MCNC: 9.2 MG/DL — SIGNIFICANT CHANGE UP (ref 8.4–10.5)
CHLORIDE SERPL-SCNC: 98 MMOL/L — SIGNIFICANT CHANGE UP (ref 96–108)
CO2 SERPL-SCNC: 26 MMOL/L — SIGNIFICANT CHANGE UP (ref 22–31)
CREAT SERPL-MCNC: 0.56 MG/DL — SIGNIFICANT CHANGE UP (ref 0.5–1.3)
GLUCOSE SERPL-MCNC: 100 MG/DL — HIGH (ref 70–99)
HCT VFR BLD CALC: 30.4 % — LOW (ref 34.5–45)
HGB BLD-MCNC: 10.1 G/DL — LOW (ref 11.5–15.5)
MCHC RBC-ENTMCNC: 28.7 PG — SIGNIFICANT CHANGE UP (ref 27–34)
MCHC RBC-ENTMCNC: 33.2 G/DL — SIGNIFICANT CHANGE UP (ref 32–36)
MCV RBC AUTO: 86.4 FL — SIGNIFICANT CHANGE UP (ref 80–100)
PLATELET # BLD AUTO: 257 K/UL — SIGNIFICANT CHANGE UP (ref 150–400)
POTASSIUM SERPL-MCNC: 3.6 MMOL/L — SIGNIFICANT CHANGE UP (ref 3.5–5.3)
POTASSIUM SERPL-SCNC: 3.6 MMOL/L — SIGNIFICANT CHANGE UP (ref 3.5–5.3)
RBC # BLD: 3.52 M/UL — LOW (ref 3.8–5.2)
RBC # FLD: 13 % — SIGNIFICANT CHANGE UP (ref 10.3–16.9)
SODIUM SERPL-SCNC: 138 MMOL/L — SIGNIFICANT CHANGE UP (ref 135–145)
WBC # BLD: 5.4 K/UL — SIGNIFICANT CHANGE UP (ref 3.8–10.5)
WBC # FLD AUTO: 5.4 K/UL — SIGNIFICANT CHANGE UP (ref 3.8–10.5)

## 2017-10-30 PROCEDURE — 97161 PT EVAL LOW COMPLEX 20 MIN: CPT

## 2017-10-30 PROCEDURE — 80048 BASIC METABOLIC PNL TOTAL CA: CPT

## 2017-10-30 PROCEDURE — C1889: CPT

## 2017-10-30 PROCEDURE — 85025 COMPLETE CBC W/AUTO DIFF WBC: CPT

## 2017-10-30 PROCEDURE — 95940 IONM IN OPERATNG ROOM 15 MIN: CPT

## 2017-10-30 PROCEDURE — 36415 COLL VENOUS BLD VENIPUNCTURE: CPT

## 2017-10-30 PROCEDURE — 85027 COMPLETE CBC AUTOMATED: CPT

## 2017-10-30 PROCEDURE — 88304 TISSUE EXAM BY PATHOLOGIST: CPT

## 2017-10-30 PROCEDURE — C1713: CPT

## 2017-10-30 PROCEDURE — 86900 BLOOD TYPING SEROLOGIC ABO: CPT

## 2017-10-30 PROCEDURE — 86901 BLOOD TYPING SEROLOGIC RH(D): CPT

## 2017-10-30 PROCEDURE — 76000 FLUOROSCOPY <1 HR PHYS/QHP: CPT

## 2017-10-30 PROCEDURE — 97116 GAIT TRAINING THERAPY: CPT

## 2017-10-30 PROCEDURE — 86850 RBC ANTIBODY SCREEN: CPT

## 2017-10-30 RX ORDER — DIPHENHYDRAMINE HCL 50 MG
0 CAPSULE ORAL
Qty: 0 | Refills: 0 | COMMUNITY

## 2017-10-30 RX ORDER — SENNA PLUS 8.6 MG/1
2 TABLET ORAL
Qty: 0 | Refills: 0 | COMMUNITY
Start: 2017-10-30

## 2017-10-30 RX ORDER — DOCUSATE SODIUM 100 MG
1 CAPSULE ORAL
Qty: 0 | Refills: 0 | COMMUNITY
Start: 2017-10-30

## 2017-10-30 RX ORDER — POTASSIUM CHLORIDE 20 MEQ
20 PACKET (EA) ORAL ONCE
Qty: 0 | Refills: 0 | Status: COMPLETED | OUTPATIENT
Start: 2017-10-30 | End: 2017-10-30

## 2017-10-30 RX ORDER — TRAMADOL HYDROCHLORIDE 50 MG/1
1 TABLET ORAL
Qty: 28 | Refills: 0 | OUTPATIENT
Start: 2017-10-30 | End: 2017-11-06

## 2017-10-30 RX ORDER — TRAMADOL HYDROCHLORIDE 50 MG/1
0 TABLET ORAL
Qty: 0 | Refills: 0 | COMMUNITY

## 2017-10-30 RX ORDER — ACETAMINOPHEN 500 MG
2 TABLET ORAL
Qty: 0 | Refills: 0 | COMMUNITY

## 2017-10-30 RX ADMIN — FAMOTIDINE 20 MILLIGRAM(S): 10 INJECTION INTRAVENOUS at 05:14

## 2017-10-30 RX ADMIN — Medication 975 MILLIGRAM(S): at 14:05

## 2017-10-30 RX ADMIN — TRAMADOL HYDROCHLORIDE 50 MILLIGRAM(S): 50 TABLET ORAL at 06:02

## 2017-10-30 RX ADMIN — TRAMADOL HYDROCHLORIDE 50 MILLIGRAM(S): 50 TABLET ORAL at 14:05

## 2017-10-30 RX ADMIN — Medication 100 MILLIGRAM(S): at 05:14

## 2017-10-30 RX ADMIN — Medication 975 MILLIGRAM(S): at 05:14

## 2017-10-30 RX ADMIN — TRAMADOL HYDROCHLORIDE 50 MILLIGRAM(S): 50 TABLET ORAL at 14:25

## 2017-10-30 RX ADMIN — TRAMADOL HYDROCHLORIDE 50 MILLIGRAM(S): 50 TABLET ORAL at 06:24

## 2017-10-30 NOTE — PROGRESS NOTE ADULT - SUBJECTIVE AND OBJECTIVE BOX
Pain Management Progress Note - [facility] [phone]    Pt seen at 9:50 am    HPI: Pt endorses being free from pain while on current pain regimen.  Numbness in the left leg and foot unchanged -  was present pre op.  No other concerns at this time.      Pertinent PMH: Pain at: _x__Back ___Neck___Knee ___Hip ___Shoulder ___ Opioid tolerance    Pain is ___ sharp ____dull x___burning ___achy ___ Intensity: ____ mild ____mod ____severe     Location __x___surgical site _____cervical _____lumbar ____abd _____upper ext____lower ext    Worse with _x___activity _x___movement _____physical therapy___ Rest    Improved with __x__medication __x__rest ____physical therapy      influenza   Vaccine  lactated ringers.  acetaminophen   Tablet  acetaminophen   Tablet.  oxyCODONE    5 mG/acetaminophen 325 mG  oxyCODONE    5 mG/acetaminophen 325 mG  HYDROmorphone  Injectable  ceFAZolin   IVPB  aluminum hydroxide/magnesium hydroxide/simethicone Suspension  famotidine    Tablet  ondansetron Injectable  docusate sodium  senna  HYDROmorphone  Injectable  famotidine Injectable  BUpivacaine liposome 1.3% Injectable (no eMAR)  metoclopramide Injectable  scopolamine   Patch  (ADM OVERRIDE)  HYDROmorphone   Tablet  (ADM OVERRIDE)  HYDROmorphone  Injectable  HYDROmorphone PCA (1 mG/mL)  HYDROmorphone PCA (1 mG/mL) Rescue Clinician Bolus  naloxone Injectable  acetaminophen   Tablet  HYDROmorphone   Tablet  HYDROmorphone   Tablet  HYDROmorphone  Injectable  traMADol  traMADol  potassium chloride    Tablet ER      ROS: Const:  _-__febrile   Eyes:__-_ENT:_-__CV: __-_chest pain  Resp: ___-_sob  GI:__-_nausea _-__vomiting __-__abd pain ___npo ___clears __x_full diet __bm  :__-_ Musk: __-_pain __-_spasm  Skin:__-_ Neuro:  __-_npkvjhwv__-_hgcgkzjhi___q_ numbness LLE ___weakness ___paresthesia  Psych:_-__anxiety  Endo:__-_ Heme:___Allergy:_-__      10-30 @ 06:99989 mL/min/1.73M2          Hemoglobin: 10.1 g/dL (10-30 @ 06:25)        T(C): 37.1 (10-30-17 @ 10:29), Max: 37.2 (10-29-17 @ 20:35)  HR: 79 (10-30-17 @ 10:29) (79 - 99)  BP: 103/68 (10-30-17 @ 10:29) (102/68 - 112/76)  RR: 16 (10-30-17 @ 10:29) (15 - 17)  SpO2: 100% (10-30-17 @ 10:29) (98% - 100%)  Wt(kg): --     PHYSICAL EXAM:  Gen Appearance: _x__no acute distress _x__appropriate         Neuro: _x__SILT feet__x__decreased sensation L leg/foot_x_ EOM Intact Psych: AAOX_3_, _x__mood/affect appropriate        Eyes: __x_conjunctiva WNL  __x___ Pupils equal and round        ENT: x___ears and nose atraumatic__x_ Hearing grossly intact        Neck: _x__trachea midline, no visible masses ___thyroid without palpable mass    Resp: x___Nml WOB____No tactile fremitus ___clear to auscultation    Cardio: _x__extremities free from edema ____pedal pulses palpable    GI/Abdomen: x___soft ___x__ Nontender_x_____Nondistended_____HSM    Lymphatic: ___no palpable nodes in neck  ___no palpable nodes calves and feet    Skin/Wound: ___Incision, ___Dressing c/d/i,   ____surrounding tissues soft,  ___drain/chest tube present____    Muscular: EHL _5__/5  Gastrocnemius__5_/5    __x_absent clubbing/cyanosis         ASSESSMENT:  This is a 31y old Female with a history of:  M51.26  No h/o HF  Breast cancer (Mother)  Family history of diabetes mellitus (Mother)  Handoff  MEWS Score  HNP (herniated nucleus pulposus), lumbar  No pertinent past medical history  HNP (herniated nucleus pulposus), lumbar  HNP (herniated nucleus pulposus), lumbar  Spine exposure, circumferential, with vertebral resection, 1-2 segments  History of tonsillectomy  H/O wisdom tooth extraction        Recommended Treatment PLAN:    1.  c/w Dilaudid 2-4 mg po q4h prn  2.  c/w Dilaudid 0.5 mg IV q2h prn  3.  c/w Tylenol 975 mg po q8h

## 2017-10-30 NOTE — PROGRESS NOTE ADULT - SUBJECTIVE AND OBJECTIVE BOX
Orthopaedic Spine Note    S: No acute events overnight. passing flatus/BMs    Vital Signs Last 24 Hrs  T(C): 37.2 (29 Oct 2017 20:35), Max: 37.2 (29 Oct 2017 20:35)  T(F): 98.9 (29 Oct 2017 20:35), Max: 98.9 (29 Oct 2017 20:35)  HR: 99 (29 Oct 2017 20:35) (85 - 99)  BP: 112/76 (29 Oct 2017 20:35) (98/63 - 112/76)  BP(mean): --  RR: 17 (29 Oct 2017 20:35) (14 - 17)  SpO2: 98% (29 Oct 2017 20:35) (97% - 99%)    II&O's Detail    28 Oct 2017 07:01  -  29 Oct 2017 07:00  --------------------------------------------------------  IN:    Oral Fluid: 1080 mL  Total IN: 1080 mL    OUT:    Voided: 950 mL  Total OUT: 950 mL    Total NET: 130 mL              Phys Exam  Abd dressing CDI  (L) Lower Extremity   Motor  - Hip flexors: 5/5  - Quads: 5/5  - Hamstrings: 5/5  - TA: 5/5  - EHL: 5/5  - GSC: 5/5  SILT L1 - S2 dermatomal distribution  WWP toes. DP and PT pulse 2+    (R) Lower Extremity   Motor  - Hip flexors: 5/5  - Quads: 5/5  - Hamstrings: 5/5  - TA: 5/5  - EHL: 5/5  - GSC: 5/5  SILT L1 - S2 dermatomal distribution  WWP toes. DP and PT pulse 2+               9.8    9.4   )-----------( 148      ( 27 Oct 2017 07:07 )             28.8     10-27    134<L>  |  96  |  4<L>  ----------------------------<  99  3.6   |  25  |  0.70    Ca    8.6      27 Oct 2017 07:07          MEDICATIONS  (STANDING):  acetaminophen   Tablet 975 milliGRAM(s) Oral every 8 hours  BUpivacaine liposome 1.3% Injectable (no eMAR) 20 milliLiter(s) Local Injection once  docusate sodium 100 milliGRAM(s) Oral two times a day  famotidine    Tablet 20 milliGRAM(s) Oral every 12 hours  HYDROmorphone PCA (1 mG/mL) 30 milliLiter(s) PCA Continuous PCA Continuous  influenza   Vaccine 0.5 milliLiter(s) IntraMuscular once  lactated ringers. 1000 milliLiter(s) (125 mL/Hr) IV Continuous <Continuous>  scopolamine   Patch 1.5 milliGRAM(s) Transdermal every 3 days  senna 2 Tablet(s) Oral at bedtime    MEDICATIONS  (PRN):  acetaminophen   Tablet 650 milliGRAM(s) Oral every 6 hours PRN For Temp greater than 38 C (100.4 F)  aluminum hydroxide/magnesium hydroxide/simethicone Suspension 30 milliLiter(s) Oral every 12 hours PRN Indigestion  HYDROmorphone PCA (1 mG/mL) Rescue Clinician Bolus 0.5 milliGRAM(s) IV Push every 1 hour PRN for Pain Scale GREATER THAN 6  metoclopramide Injectable 10 milliGRAM(s) IV Push every 6 hours PRN nausea/vomiting  naloxone Injectable 0.1 milliGRAM(s) IV Push every 3 minutes PRN For ANY of the following changes in patient status:  A. RR LESS THAN 10 breaths per minute, B. Oxygen saturation LESS THAN 90%, C. Sedation score of 6  ondansetron Injectable 4 milliGRAM(s) IV Push every 6 hours PRN Nausea      Assess / Plan   31y year old Female s/p Artificial Disc Replacement L4-5, L5-S1, POD 5  - WBAT  - OOB w PT  - DVT ppx  - Pain control  - Bella-op antibiotics  - Advanced diet, passing flatus/BM  - Dispo planning- Home Orthopaedic Spine Note    S: No acute events overnight. passing flatus/BMs    Vital Signs Last 24 Hrs  T(C): 37.2 (29 Oct 2017 20:35), Max: 37.2 (29 Oct 2017 20:35)  T(F): 98.9 (29 Oct 2017 20:35), Max: 98.9 (29 Oct 2017 20:35)  HR: 99 (29 Oct 2017 20:35) (85 - 99)  BP: 112/76 (29 Oct 2017 20:35) (98/63 - 112/76)  BP(mean): --  RR: 17 (29 Oct 2017 20:35) (14 - 17)  SpO2: 98% (29 Oct 2017 20:35) (97% - 99%)    II&O's Detail    28 Oct 2017 07:01  -  29 Oct 2017 07:00  --------------------------------------------------------  IN:    Oral Fluid: 1080 mL  Total IN: 1080 mL    OUT:    Voided: 950 mL  Total OUT: 950 mL    Total NET: 130 mL              Phys Exam  Abd dressing CDI- changed this am  (L) Lower Extremity   Motor  - Hip flexors: 5/5  - Quads: 5/5  - Hamstrings: 5/5  - TA: 5/5  - EHL: 5/5  - GSC: 5/5  SILT L1 - S2 dermatomal distribution  WWP toes. DP and PT pulse 2+    (R) Lower Extremity   Motor  - Hip flexors: 5/5  - Quads: 5/5  - Hamstrings: 5/5  - TA: 5/5  - EHL: 5/5  - GSC: 5/5  SILT L1 - S2 dermatomal distribution  WWP toes. DP and PT pulse 2+               9.8    9.4   )-----------( 148      ( 27 Oct 2017 07:07 )             28.8     10-27    134<L>  |  96  |  4<L>  ----------------------------<  99  3.6   |  25  |  0.70    Ca    8.6      27 Oct 2017 07:07          MEDICATIONS  (STANDING):  acetaminophen   Tablet 975 milliGRAM(s) Oral every 8 hours  BUpivacaine liposome 1.3% Injectable (no eMAR) 20 milliLiter(s) Local Injection once  docusate sodium 100 milliGRAM(s) Oral two times a day  famotidine    Tablet 20 milliGRAM(s) Oral every 12 hours  HYDROmorphone PCA (1 mG/mL) 30 milliLiter(s) PCA Continuous PCA Continuous  influenza   Vaccine 0.5 milliLiter(s) IntraMuscular once  lactated ringers. 1000 milliLiter(s) (125 mL/Hr) IV Continuous <Continuous>  scopolamine   Patch 1.5 milliGRAM(s) Transdermal every 3 days  senna 2 Tablet(s) Oral at bedtime    MEDICATIONS  (PRN):  acetaminophen   Tablet 650 milliGRAM(s) Oral every 6 hours PRN For Temp greater than 38 C (100.4 F)  aluminum hydroxide/magnesium hydroxide/simethicone Suspension 30 milliLiter(s) Oral every 12 hours PRN Indigestion  HYDROmorphone PCA (1 mG/mL) Rescue Clinician Bolus 0.5 milliGRAM(s) IV Push every 1 hour PRN for Pain Scale GREATER THAN 6  metoclopramide Injectable 10 milliGRAM(s) IV Push every 6 hours PRN nausea/vomiting  naloxone Injectable 0.1 milliGRAM(s) IV Push every 3 minutes PRN For ANY of the following changes in patient status:  A. RR LESS THAN 10 breaths per minute, B. Oxygen saturation LESS THAN 90%, C. Sedation score of 6  ondansetron Injectable 4 milliGRAM(s) IV Push every 6 hours PRN Nausea      Assess / Plan   31y year old Female s/p Artificial Disc Replacement L4-5, L5-S1, POD 5  - WBAT  - OOB w PT  - DVT ppx  - Pain control  - Bella-op antibiotics  - Advanced diet, passing flatus/BM  - Dispo planning- Home

## 2017-11-01 LAB — SURGICAL PATHOLOGY STUDY: SIGNIFICANT CHANGE UP

## 2017-11-07 DIAGNOSIS — Z80.3 FAMILY HISTORY OF MALIGNANT NEOPLASM OF BREAST: ICD-10-CM

## 2017-11-07 DIAGNOSIS — M51.26 OTHER INTERVERTEBRAL DISC DISPLACEMENT, LUMBAR REGION: ICD-10-CM

## 2017-11-07 DIAGNOSIS — Z28.21 IMMUNIZATION NOT CARRIED OUT BECAUSE OF PATIENT REFUSAL: ICD-10-CM

## 2017-11-07 DIAGNOSIS — Z83.3 FAMILY HISTORY OF DIABETES MELLITUS: ICD-10-CM

## 2017-11-07 DIAGNOSIS — M51.36 OTHER INTERVERTEBRAL DISC DEGENERATION, LUMBAR REGION: ICD-10-CM

## 2017-11-07 DIAGNOSIS — M51.37 OTHER INTERVERTEBRAL DISC DEGENERATION, LUMBOSACRAL REGION: ICD-10-CM

## 2017-11-13 DIAGNOSIS — E66.9 OBESITY, UNSPECIFIED: ICD-10-CM
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
## 2023-10-05 NOTE — H&P ADULT - NSHPSOCIALHISTORY_GEN_ALL_CORE
ETOH: 2-3 times/month and 1-2 drinks in a sitting, beer or wine.  Tobacco: Never smoker  Drugs: Marijuana 2-3 times/month Doxepin Pregnancy And Lactation Text: This medication is Pregnancy Category C and it isn't known if it is safe during pregnancy. It is also excreted in breast milk and breast feeding isn't recommended.

## 2024-07-13 NOTE — PATIENT PROFILE ADULT. - EXTENSIONS OF SELF_ADULT
Transfer NUMC---Transferred to [facility] for EP eval for bifascicular block. Transfer NUMC---> Transferred to [facility] for EP eval for bifascicular block. Eyeglasses/Contact Lens (es)
